# Patient Record
Sex: FEMALE | Race: OTHER | NOT HISPANIC OR LATINO | URBAN - METROPOLITAN AREA
[De-identification: names, ages, dates, MRNs, and addresses within clinical notes are randomized per-mention and may not be internally consistent; named-entity substitution may affect disease eponyms.]

---

## 2017-10-18 ENCOUNTER — OUTPATIENT (OUTPATIENT)
Dept: OUTPATIENT SERVICES | Facility: HOSPITAL | Age: 62
LOS: 1 days | End: 2017-10-18
Payer: COMMERCIAL

## 2017-10-18 DIAGNOSIS — J45.30 MILD PERSISTENT ASTHMA, UNCOMPLICATED: ICD-10-CM

## 2017-10-18 PROCEDURE — 94729 DIFFUSING CAPACITY: CPT

## 2017-10-18 PROCEDURE — 94060 EVALUATION OF WHEEZING: CPT

## 2017-10-18 PROCEDURE — 94010 BREATHING CAPACITY TEST: CPT

## 2018-12-27 ENCOUNTER — EMERGENCY (EMERGENCY)
Facility: HOSPITAL | Age: 63
LOS: 1 days | Discharge: HOME | End: 2018-12-27
Attending: EMERGENCY MEDICINE | Admitting: EMERGENCY MEDICINE
Payer: COMMERCIAL

## 2018-12-27 VITALS
HEART RATE: 81 BPM | TEMPERATURE: 99 F | DIASTOLIC BLOOD PRESSURE: 73 MMHG | SYSTOLIC BLOOD PRESSURE: 107 MMHG | RESPIRATION RATE: 16 BRPM | OXYGEN SATURATION: 100 %

## 2018-12-27 VITALS
DIASTOLIC BLOOD PRESSURE: 51 MMHG | SYSTOLIC BLOOD PRESSURE: 118 MMHG | RESPIRATION RATE: 14 BRPM | OXYGEN SATURATION: 100 % | TEMPERATURE: 99 F | HEART RATE: 58 BPM

## 2018-12-27 LAB
ALBUMIN SERPL ELPH-MCNC: 3.9 G/DL — SIGNIFICANT CHANGE UP (ref 3.3–5)
ALP SERPL-CCNC: 115 U/L — SIGNIFICANT CHANGE UP (ref 40–120)
ALT FLD-CCNC: 18 U/L — SIGNIFICANT CHANGE UP (ref 4–33)
AST SERPL-CCNC: 10 U/L — SIGNIFICANT CHANGE UP (ref 4–32)
BASE EXCESS BLDV CALC-SCNC: 5 MMOL/L — SIGNIFICANT CHANGE UP
BASOPHILS # BLD AUTO: 0.01 K/UL — SIGNIFICANT CHANGE UP (ref 0–0.2)
BASOPHILS NFR BLD AUTO: 0.1 % — SIGNIFICANT CHANGE UP (ref 0–2)
BILIRUB SERPL-MCNC: 0.4 MG/DL — SIGNIFICANT CHANGE UP (ref 0.2–1.2)
BLOOD GAS VENOUS - CREATININE: 0.46 MG/DL — LOW (ref 0.5–1.3)
BUN SERPL-MCNC: 27 MG/DL — HIGH (ref 7–23)
CALCIUM SERPL-MCNC: 8.8 MG/DL — SIGNIFICANT CHANGE UP (ref 8.4–10.5)
CHLORIDE BLDV-SCNC: 105 MMOL/L — SIGNIFICANT CHANGE UP (ref 96–108)
CHLORIDE SERPL-SCNC: 100 MMOL/L — SIGNIFICANT CHANGE UP (ref 98–107)
CO2 SERPL-SCNC: 28 MMOL/L — SIGNIFICANT CHANGE UP (ref 22–31)
CREAT SERPL-MCNC: 0.68 MG/DL — SIGNIFICANT CHANGE UP (ref 0.5–1.3)
EOSINOPHIL # BLD AUTO: 0.22 K/UL — SIGNIFICANT CHANGE UP (ref 0–0.5)
EOSINOPHIL NFR BLD AUTO: 2.6 % — SIGNIFICANT CHANGE UP (ref 0–6)
GAS PNL BLDV: 135 MMOL/L — LOW (ref 136–146)
GLUCOSE BLDV-MCNC: 148 — HIGH (ref 70–99)
GLUCOSE SERPL-MCNC: 135 MG/DL — HIGH (ref 70–99)
HCO3 BLDV-SCNC: 26 MMOL/L — SIGNIFICANT CHANGE UP (ref 20–27)
HCT VFR BLD CALC: 43.7 % — SIGNIFICANT CHANGE UP (ref 34.5–45)
HCT VFR BLDV CALC: 42 % — SIGNIFICANT CHANGE UP (ref 34.5–45)
HGB BLD-MCNC: 13.1 G/DL — SIGNIFICANT CHANGE UP (ref 11.5–15.5)
HGB BLDV-MCNC: 13.7 G/DL — SIGNIFICANT CHANGE UP (ref 11.5–15.5)
IMM GRANULOCYTES # BLD AUTO: 0.03 # — SIGNIFICANT CHANGE UP
IMM GRANULOCYTES NFR BLD AUTO: 0.3 % — SIGNIFICANT CHANGE UP (ref 0–1.5)
LACTATE BLDV-MCNC: 1.2 MMOL/L — SIGNIFICANT CHANGE UP (ref 0.5–2)
LIDOCAIN IGE QN: 45.2 U/L — SIGNIFICANT CHANGE UP (ref 7–60)
LYMPHOCYTES # BLD AUTO: 1.92 K/UL — SIGNIFICANT CHANGE UP (ref 1–3.3)
LYMPHOCYTES # BLD AUTO: 22.3 % — SIGNIFICANT CHANGE UP (ref 13–44)
MCHC RBC-ENTMCNC: 23.6 PG — LOW (ref 27–34)
MCHC RBC-ENTMCNC: 30 % — LOW (ref 32–36)
MCV RBC AUTO: 78.9 FL — LOW (ref 80–100)
MONOCYTES # BLD AUTO: 0.54 K/UL — SIGNIFICANT CHANGE UP (ref 0–0.9)
MONOCYTES NFR BLD AUTO: 6.3 % — SIGNIFICANT CHANGE UP (ref 2–14)
NEUTROPHILS # BLD AUTO: 5.88 K/UL — SIGNIFICANT CHANGE UP (ref 1.8–7.4)
NEUTROPHILS NFR BLD AUTO: 68.4 % — SIGNIFICANT CHANGE UP (ref 43–77)
NRBC # FLD: 0 — SIGNIFICANT CHANGE UP
PCO2 BLDV: 52 MMHG — HIGH (ref 41–51)
PH BLDV: 7.38 PH — SIGNIFICANT CHANGE UP (ref 7.32–7.43)
PLATELET # BLD AUTO: 233 K/UL — SIGNIFICANT CHANGE UP (ref 150–400)
PMV BLD: 11.9 FL — SIGNIFICANT CHANGE UP (ref 7–13)
PO2 BLDV: 20 MMHG — LOW (ref 35–40)
POTASSIUM BLDV-SCNC: 3.7 MMOL/L — SIGNIFICANT CHANGE UP (ref 3.4–4.5)
POTASSIUM SERPL-MCNC: 3.9 MMOL/L — SIGNIFICANT CHANGE UP (ref 3.5–5.3)
POTASSIUM SERPL-SCNC: 3.9 MMOL/L — SIGNIFICANT CHANGE UP (ref 3.5–5.3)
PROT SERPL-MCNC: 6.9 G/DL — SIGNIFICANT CHANGE UP (ref 6–8.3)
RBC # BLD: 5.54 M/UL — HIGH (ref 3.8–5.2)
RBC # FLD: 16.7 % — HIGH (ref 10.3–14.5)
SAO2 % BLDV: 24.3 % — LOW (ref 60–85)
SODIUM SERPL-SCNC: 138 MMOL/L — SIGNIFICANT CHANGE UP (ref 135–145)
TROPONIN T, HIGH SENSITIVITY: < 6 NG/L — SIGNIFICANT CHANGE UP (ref ?–14)
WBC # BLD: 8.6 K/UL — SIGNIFICANT CHANGE UP (ref 3.8–10.5)
WBC # FLD AUTO: 8.6 K/UL — SIGNIFICANT CHANGE UP (ref 3.8–10.5)

## 2018-12-27 PROCEDURE — 71046 X-RAY EXAM CHEST 2 VIEWS: CPT | Mod: 26

## 2018-12-27 PROCEDURE — 76705 ECHO EXAM OF ABDOMEN: CPT | Mod: 26

## 2018-12-27 PROCEDURE — 99284 EMERGENCY DEPT VISIT MOD MDM: CPT | Mod: 25

## 2018-12-27 RX ORDER — FAMOTIDINE 10 MG/ML
20 INJECTION INTRAVENOUS ONCE
Qty: 0 | Refills: 0 | Status: COMPLETED | OUTPATIENT
Start: 2018-12-27 | End: 2018-12-27

## 2018-12-27 RX ORDER — SODIUM CHLORIDE 9 MG/ML
1000 INJECTION INTRAMUSCULAR; INTRAVENOUS; SUBCUTANEOUS ONCE
Qty: 0 | Refills: 0 | Status: COMPLETED | OUTPATIENT
Start: 2018-12-27 | End: 2018-12-27

## 2018-12-27 RX ORDER — ACETAMINOPHEN 500 MG
650 TABLET ORAL ONCE
Qty: 0 | Refills: 0 | Status: COMPLETED | OUTPATIENT
Start: 2018-12-27 | End: 2018-12-27

## 2018-12-27 RX ADMIN — Medication 650 MILLIGRAM(S): at 11:41

## 2018-12-27 RX ADMIN — SODIUM CHLORIDE 1000 MILLILITER(S): 9 INJECTION INTRAMUSCULAR; INTRAVENOUS; SUBCUTANEOUS at 12:16

## 2018-12-27 RX ADMIN — FAMOTIDINE 20 MILLIGRAM(S): 10 INJECTION INTRAVENOUS at 11:41

## 2018-12-27 NOTE — ED PROVIDER NOTE - NS ED ROS FT
GENERAL: +fever or chills, //             EYES: no change in vision, //             HEENT: no trouble swallowing or speaking, //             CARDIAC:  +chest wall pain, //              PULMONARY: + cough, no SOB, //             GI: +epigastric pain, no abdominal pain, no nausea or no vomiting, no diarrhea or constipation, //             : No changes in urination,  //            SKIN: no rashes,  //            NEURO: no headache,  //             MSK: +body aches, chest wall and back pain, otherwise as HPI or negative.

## 2018-12-27 NOTE — ED ADULT TRIAGE NOTE - CHIEF COMPLAINT QUOTE
pt states has been having chronic cough with clear sputum x 3 weeks with intermittent pains to mid sternal chest, bilateral ribs and mid back pain. Last night started with nausea/vomiting. C/o intermittent subjective fevers at night. pt states has been having chronic cough with clear sputum x 3 weeks with intermittent pains to mid sternal chest, bilateral ribs and mid back pain. Last night started with nausea/vomiting. C/o intermittent subjective fevers at night. Hx Dm2 [id number]

## 2018-12-27 NOTE — ED PROVIDER NOTE - PHYSICAL EXAMINATION
Gen: NAD, AOx3, able to make needs known, non-toxic //            Head: NCAT //            HEENT: EOMI, oral mucosa moist, normal conjunctiva //            Lung: CTAB, no respiratory distress, no wheezes/rhonchi/rales B/L, speaking in full sentences. //            CV: RRR, no murmurs//            Abd: soft, NT, +epigastric and RUQ pain, ND, no guarding, no CVA tenderness //            MSK: upper thoracic paraspinal tenderness b/l, no visible deformities //            Neuro: No focal sensory or motor deficits //            Skin: Warm, well perfused //            Psych: normal affect.

## 2018-12-27 NOTE — ED PROCEDURE NOTE - PROCEDURE ADDITIONAL DETAILS
Focused ED Ultrasound RUQ 92100  Grey scale RUQ views obtained in saggital and transverse planes.   No wall thickening, no gallbladder wall edema, and no pericholecystic fluid seen.  Negative sonographic hensley's.  No gallstones.    anterior gallbladder wall - 2.0 mm  CBD - 3.9 mm    Impression:  normal gallbladder.    - Nelli BENTLEY

## 2018-12-27 NOTE — ED PROVIDER NOTE - NSFOLLOWUPINSTRUCTIONS_ED_ALL_ED_FT
1) Follow up with your doctor within the next 2-3 days  2) Return to the ER immediately for new or worsening symptoms   3) You were provided with a copy of your test results

## 2018-12-27 NOTE — ED ADULT NURSE NOTE - CHIEF COMPLAINT QUOTE
pt states has been having chronic cough with clear sputum x 3 weeks with intermittent pains to mid sternal chest, bilateral ribs and mid back pain. Last night started with nausea/vomiting. C/o intermittent subjective fevers at night. Hx Dm2 [id number]

## 2018-12-27 NOTE — ED ADULT NURSE NOTE - NSIMPLEMENTINTERV_GEN_ALL_ED
Implemented All Universal Safety Interventions:  [place] to call system. Call bell, personal items and telephone within reach. Instruct patient to call for assistance. Room bathroom lighting operational. Non-slip footwear when patient is off stretcher. Physically safe environment: no spills, clutter or unnecessary equipment. Stretcher in lowest position, wheels locked, appropriate side rails in place.

## 2018-12-27 NOTE — ED PROVIDER NOTE - ATTENDING CONTRIBUTION TO CARE
Patient is a 64 yo F with DM, hypothyroidism, CAD with stents here for evaluation cough for 3 weeks. She states she has had intermittent tight chest pain, worse with movement and cough. Reports shortness of breath with exertion such as climbing stairs. Denies nausea, vomiting. + body aches, lower and upper back. Reports subjective fevers. She had multiple episodes of nbnb vomiting last night. No diarrhea. She is c/o epigastric abdominal pain.     VS noted  Gen. no acute distress, Non toxic   HEENT: EOMI, mmm  Lungs: CTAB/L no C/ W /R   CVS: RRR   Abd; Soft, ruq tenderness, epigastric tenderness, no rebound or guarding  Ext: no edema  Skin: no rash  Neuro AAOx3 non focal clear speech  a/p: RUQ tenderness, multiple complaints - ekg, labs including lipase, trop. Tyelnol/pepcid/IVF.   - Nelli BENTLEY Patient is a 64 yo F with asthma, DM, hypothyroidism, CAD with stents here for evaluation cough for 3 weeks. She states she has had intermittent tight chest pain, worse with movement and cough. Reports shortness of breath with exertion such as climbing stairs. + body aches, lower and upper back. Reports subjective fevers. She had multiple episodes of nbnb vomiting last night. No diarrhea. She is c/o epigastric abdominal pain. Patient saw her pcp, and was placed on prednisone 10 mg a day, given a 30 day supply. Patient states she traveled from [facility] 3 weeks ago and has seen her pcp 3 times.   pmd: Dr. Kavon Vergara  meds: benzonatate 100 mg TID, montelukast    VS noted  Gen. no acute distress, Non toxic   HEENT: EOMI, mmm  Lungs: CTAB/L no C/ W /R   CVS: RRR   Abd; Soft, ruq tenderness, epigastric tenderness, no rebound or guarding  Ext: no edema  Skin: no rash  Neuro AAOx3 non focal clear speech  a/p: RUQ tenderness, multiple complaints - ekg, labs including lipase, trop. Tyelnol/pepcid/IVF.   - Nelli BENTLEY

## 2018-12-27 NOTE — ED PROVIDER NOTE - MEDICAL DECISION MAKING DETAILS
62 y/o F w/ cough, body aches, epigastric pain, and N/V. Likely viral illnesses, but given age and PMH will eval for alternative cause of symptoms. CBC, CMP, VBG, lipase, trop, pepcid, tylenol, ekg, cxr, RUQ sono, reassess.

## 2018-12-27 NOTE — ED PROVIDER NOTE - PROGRESS NOTE DETAILS
Lisa: Spoke w/ PMD Dr. Navarro. Informed him of unremarkable test results. Clarified with him that pt is to be taking prescribed prednisone for 5-7 days at a time and not 30 days consecutively. Clarified this with the pt so she is aware. Dr. Jacobson stated he would refer pt to pulmonologist for persistent cough and SOB. Will PO challenge pt and DC. Patient tolerating PO. Will d/c home.

## 2018-12-27 NOTE — ED PROVIDER NOTE - OBJECTIVE STATEMENT
62 y/o F w/ PMH of CAD, DM, Hypothyroid presenting w/ multiple complaints. Pt reports developing a lingering cough for the past 3 weeks. Cough is non productive of sputum. Shortly after the cough started she developed chest and upper back pain as well. This pain is intermittent and worse w/ movement. Develops some shortness of breath w/ climbing stairs but no SOB at this time. Subjective fevers at home occasionally during this time, but she did not check her temperature. Also developed nausea and vomiting last night. 4 episodes all non bloody and non bilious. Has developed some epigastric pain since the nausea/vomiting. Currently no nausea/vomiting. +flu and pneumonia vaccine.

## 2018-12-27 NOTE — ED ADULT NURSE NOTE - OBJECTIVE STATEMENT
Pt A&Ox3. Ambulatory to the ED with c/o midsternal chest pain, cough with clear sputum. Pt reports n/v x4 since last night. Respirations equal, nonlabored, no sign of respiratory distress. Abdomen soft, nontender, no distention noted. Denies sob, v/d, fevers, chills at this time. IV placed, labs sent. Awaiting results will monitor

## 2019-09-22 ENCOUNTER — EMERGENCY (EMERGENCY)
Facility: HOSPITAL | Age: 64
LOS: 1 days | Discharge: HOME | End: 2019-09-22
Admitting: EMERGENCY MEDICINE
Payer: COMMERCIAL

## 2019-09-22 VITALS
OXYGEN SATURATION: 99 % | SYSTOLIC BLOOD PRESSURE: 175 MMHG | HEART RATE: 64 BPM | RESPIRATION RATE: 18 BRPM | DIASTOLIC BLOOD PRESSURE: 62 MMHG | TEMPERATURE: 98 F

## 2019-09-22 PROCEDURE — 99284 EMERGENCY DEPT VISIT MOD MDM: CPT

## 2019-09-22 PROCEDURE — 93971 EXTREMITY STUDY: CPT | Mod: 26,LT

## 2019-09-22 RX ORDER — ACETAMINOPHEN 500 MG
650 TABLET ORAL ONCE
Refills: 0 | Status: COMPLETED | OUTPATIENT
Start: 2019-09-22 | End: 2019-09-22

## 2019-09-22 RX ADMIN — Medication 650 MILLIGRAM(S): at 20:38

## 2019-09-22 NOTE — ED ADULT TRIAGE NOTE - CHIEF COMPLAINT QUOTE
Pt c/o pain behind L knee x 4 days. States she had a "microfoam sclerotherapy injection" to same area apprx 10 days ago. Hx: Hypothyroid,. Pt describes pain as "tightness." Ambulating in triage.

## 2019-09-22 NOTE — ED PROVIDER NOTE - NSFOLLOWUPINSTRUCTIONS_ED_ALL_ED_FT
Follow up with your primary care doctor and vascular doctor within one week, discuss further injections with them  Follow up with an orthopedic doctor within 1 week if pain persists, referral list provided  Take Tylenol 650mg every 6 hours for pain (you can also take Motrin 400mg every 6 hours, take with food)  Apply ACE wrap for compression and comfort  Rest and elevate the leg  Return to the ER with any worsening or concerning symptoms, increased pain or swelling, redness, fever/chills, numbness, weakness or any other concerns.

## 2019-09-22 NOTE — ED PROVIDER NOTE - PHYSICAL EXAMINATION
pt with b/l varicose veins  left knee with mild swelling to medial/posterior aspect, near full ROM, non-tender to palpation, no overlying warmth or erythema pt with b/l varicose veins  left knee with mild swelling to posterior aspect likely Bakers cyst, near full ROM, non-tender to palpation, no overlying warmth or erythema  pt is able to ambulate   no calf swelling or tenderness, sensation intact and equal b/l, strength 5/5 in b/l LEs

## 2019-09-22 NOTE — ED PROVIDER NOTE - CLINICAL SUMMARY MEDICAL DECISION MAKING FREE TEXT BOX
64yF w/pmhx DM, CAD, hypothyroid presenting with left knee pain. Recent microfoam sclerotherapy injection 2 weeks ago. Sensation intact and equal b/l, near full ROM for left knee. Will get duplex to r/o DVT, pt saw her PMD yesterday and was started on gabapentin for pain

## 2019-09-22 NOTE — ED PROVIDER NOTE - OBJECTIVE STATEMENT
64yF w/pmhx DM, CAD, hypothyroid presenting with left knee pain. Pt states she has been receiving treatment for varicose veins, 1 month ago she had a laser procedure to the right leg and 2 weeks ago she had an injection of foam sclerotherapy to behind the left knee. Pt states since the injection she has pain and swelling to the left knee and she is unable to fully range the knee. Pt reports difficulty ambulating as she is unable to fully bend the knee. Pt reports she has has been in the upper leg as well from the hip down to the knee. Pt denies numbness/tingling, weakness, hx DVT, injury to knee, fever/chills, erythema to leg, recent travel or illness or any other concerns.  Of note, pt reports normal lower extremity angiogram performed 3 months ago.

## 2019-09-22 NOTE — ED PROVIDER NOTE - PATIENT PORTAL LINK FT
You can access the FollowMyHealth Patient Portal offered by [facility] by registering at the following website: [email or web address]. By joining [employer]’s FollowMyHealth portal, you will also be able to view your health information using other applications (apps) compatible with our system.

## 2019-09-22 NOTE — ED PROVIDER NOTE - PROGRESS NOTE DETAILS
TAHIR Donis: US showing Bakers Cyst, no evidence of DVT although calf veins not well visualized will have pt repeat US with her PMD in 1 week. Pt provided ace bandage and advised to take Motrin 400mg every 8 hours pain. Pt provided with ortho follow up list and advised her to see her vascular doctor before having any further injections in the area TAHIR Donis: US showing Bakers Cyst, no evidence of DVT although calf veins not well visualized will have pt repeat US with her PMD in 1 week. Pt provided ace bandage and advised to take Motrin 400mg every 8 hours pain. Pt provided with ortho follow up list and advised her to see her vascular doctor before having any further injections in the area. Also spoke with pts daughter Shanda over the phone, advised ortho and PMD follow up and to discuss further injections with her vascular doctor. Discussed she should have a repeat US performed as calf veins were not well visualized

## 2020-08-04 NOTE — ED PROVIDER NOTE - CROS ED CONS ALL NEG
Quality 110: Preventive Care And Screening: Influenza Immunization: Influenza Immunization not Administered because Patient Refused.
Detail Level: Detailed
negative...

## 2022-05-29 ENCOUNTER — RESULT REVIEW (OUTPATIENT)
Age: 67
End: 2022-05-29

## 2022-05-29 ENCOUNTER — INPATIENT (INPATIENT)
Facility: HOSPITAL | Age: 67
LOS: 0 days | Discharge: HOME | End: 2022-05-30
Attending: SURGERY | Admitting: SURGERY
Payer: MEDICARE

## 2022-05-29 VITALS
RESPIRATION RATE: 18 BRPM | HEART RATE: 78 BPM | SYSTOLIC BLOOD PRESSURE: 144 MMHG | OXYGEN SATURATION: 99 % | DIASTOLIC BLOOD PRESSURE: 85 MMHG | TEMPERATURE: 98 F

## 2022-05-29 DIAGNOSIS — R10.9 UNSPECIFIED ABDOMINAL PAIN: ICD-10-CM

## 2022-05-29 PROBLEM — E03.9 HYPOTHYROIDISM, UNSPECIFIED: Chronic | Status: ACTIVE | Noted: 2019-09-22

## 2022-05-29 PROBLEM — E11.9 TYPE 2 DIABETES MELLITUS WITHOUT COMPLICATIONS: Chronic | Status: ACTIVE | Noted: 2019-09-22

## 2022-05-29 LAB
ALBUMIN SERPL ELPH-MCNC: 4 G/DL — SIGNIFICANT CHANGE UP (ref 3.3–5)
ALP SERPL-CCNC: 110 U/L — SIGNIFICANT CHANGE UP (ref 40–120)
ALT FLD-CCNC: 28 U/L — SIGNIFICANT CHANGE UP (ref 4–33)
ANION GAP SERPL CALC-SCNC: 13 MMOL/L — SIGNIFICANT CHANGE UP (ref 7–14)
APPEARANCE UR: CLEAR — SIGNIFICANT CHANGE UP
APTT BLD: 29.7 SEC — SIGNIFICANT CHANGE UP (ref 27–36.3)
AST SERPL-CCNC: 32 U/L — SIGNIFICANT CHANGE UP (ref 4–32)
BACTERIA # UR AUTO: ABNORMAL
BASE EXCESS BLDV CALC-SCNC: 0.8 MMOL/L — SIGNIFICANT CHANGE UP (ref -2–3)
BASE EXCESS BLDV CALC-SCNC: 1.8 MMOL/L — SIGNIFICANT CHANGE UP (ref -2–3)
BASOPHILS # BLD AUTO: 0.03 K/UL — SIGNIFICANT CHANGE UP (ref 0–0.2)
BASOPHILS NFR BLD AUTO: 0.2 % — SIGNIFICANT CHANGE UP (ref 0–2)
BILIRUB SERPL-MCNC: 0.5 MG/DL — SIGNIFICANT CHANGE UP (ref 0.2–1.2)
BILIRUB UR-MCNC: NEGATIVE — SIGNIFICANT CHANGE UP
BLD GP AB SCN SERPL QL: NEGATIVE — SIGNIFICANT CHANGE UP
BLOOD GAS VENOUS COMPREHENSIVE RESULT: SIGNIFICANT CHANGE UP
BLOOD GAS VENOUS COMPREHENSIVE RESULT: SIGNIFICANT CHANGE UP
BUN SERPL-MCNC: 12 MG/DL — SIGNIFICANT CHANGE UP (ref 7–23)
CALCIUM SERPL-MCNC: 9.5 MG/DL — SIGNIFICANT CHANGE UP (ref 8.4–10.5)
CHLORIDE BLDV-SCNC: 102 MMOL/L — SIGNIFICANT CHANGE UP (ref 96–108)
CHLORIDE BLDV-SCNC: 102 MMOL/L — SIGNIFICANT CHANGE UP (ref 96–108)
CHLORIDE SERPL-SCNC: 100 MMOL/L — SIGNIFICANT CHANGE UP (ref 98–107)
CO2 BLDV-SCNC: 29 MMOL/L — HIGH (ref 22–26)
CO2 BLDV-SCNC: 29.8 MMOL/L — HIGH (ref 22–26)
CO2 SERPL-SCNC: 24 MMOL/L — SIGNIFICANT CHANGE UP (ref 22–31)
COLOR SPEC: SIGNIFICANT CHANGE UP
CREAT SERPL-MCNC: 0.65 MG/DL — SIGNIFICANT CHANGE UP (ref 0.5–1.3)
DIFF PNL FLD: NEGATIVE — SIGNIFICANT CHANGE UP
EGFR: 96 ML/MIN/1.73M2 — SIGNIFICANT CHANGE UP
EOSINOPHIL # BLD AUTO: 0.13 K/UL — SIGNIFICANT CHANGE UP (ref 0–0.5)
EOSINOPHIL NFR BLD AUTO: 0.7 % — SIGNIFICANT CHANGE UP (ref 0–6)
EPI CELLS # UR: 3 /HPF — SIGNIFICANT CHANGE UP (ref 0–5)
GAS PNL BLDV: 135 MMOL/L — LOW (ref 136–145)
GAS PNL BLDV: 135 MMOL/L — LOW (ref 136–145)
GLUCOSE BLDC GLUCOMTR-MCNC: 120 MG/DL — HIGH (ref 70–99)
GLUCOSE BLDC GLUCOMTR-MCNC: 133 MG/DL — HIGH (ref 70–99)
GLUCOSE BLDC GLUCOMTR-MCNC: 160 MG/DL — HIGH (ref 70–99)
GLUCOSE BLDC GLUCOMTR-MCNC: 97 MG/DL — SIGNIFICANT CHANGE UP (ref 70–99)
GLUCOSE BLDV-MCNC: 124 MG/DL — HIGH (ref 70–99)
GLUCOSE BLDV-MCNC: 137 MG/DL — HIGH (ref 70–99)
GLUCOSE SERPL-MCNC: 134 MG/DL — HIGH (ref 70–99)
GLUCOSE UR QL: NEGATIVE — SIGNIFICANT CHANGE UP
HCO3 BLDV-SCNC: 27 MMOL/L — SIGNIFICANT CHANGE UP (ref 22–29)
HCO3 BLDV-SCNC: 28 MMOL/L — SIGNIFICANT CHANGE UP (ref 22–29)
HCT VFR BLD CALC: 38.4 % — SIGNIFICANT CHANGE UP (ref 34.5–45)
HCT VFR BLDA CALC: 35 % — SIGNIFICANT CHANGE UP (ref 34.5–46.5)
HCT VFR BLDA CALC: 36 % — SIGNIFICANT CHANGE UP (ref 34.5–46.5)
HGB BLD CALC-MCNC: 11.6 G/DL — SIGNIFICANT CHANGE UP (ref 11.5–15.5)
HGB BLD CALC-MCNC: 12.1 G/DL — SIGNIFICANT CHANGE UP (ref 11.5–15.5)
HGB BLD-MCNC: 11.9 G/DL — SIGNIFICANT CHANGE UP (ref 11.5–15.5)
HYALINE CASTS # UR AUTO: 1 /LPF — SIGNIFICANT CHANGE UP (ref 0–7)
IANC: 15.36 K/UL — HIGH (ref 1.8–7.4)
IMM GRANULOCYTES NFR BLD AUTO: 0.4 % — SIGNIFICANT CHANGE UP (ref 0–1.5)
INR BLD: 1.09 RATIO — SIGNIFICANT CHANGE UP (ref 0.88–1.16)
KETONES UR-MCNC: NEGATIVE — SIGNIFICANT CHANGE UP
LACTATE BLDV-MCNC: 1.8 MMOL/L — SIGNIFICANT CHANGE UP (ref 0.5–2)
LACTATE BLDV-MCNC: 2.5 MMOL/L — HIGH (ref 0.5–2)
LEUKOCYTE ESTERASE UR-ACNC: ABNORMAL
LIDOCAIN IGE QN: 54 U/L — SIGNIFICANT CHANGE UP (ref 7–60)
LYMPHOCYTES # BLD AUTO: 12.8 % — LOW (ref 13–44)
LYMPHOCYTES # BLD AUTO: 2.44 K/UL — SIGNIFICANT CHANGE UP (ref 1–3.3)
MAGNESIUM SERPL-MCNC: 1.8 MG/DL — SIGNIFICANT CHANGE UP (ref 1.6–2.6)
MCHC RBC-ENTMCNC: 24.3 PG — LOW (ref 27–34)
MCHC RBC-ENTMCNC: 31 GM/DL — LOW (ref 32–36)
MCV RBC AUTO: 78.5 FL — LOW (ref 80–100)
MONOCYTES # BLD AUTO: 0.97 K/UL — HIGH (ref 0–0.9)
MONOCYTES NFR BLD AUTO: 5.1 % — SIGNIFICANT CHANGE UP (ref 2–14)
NEUTROPHILS # BLD AUTO: 15.36 K/UL — HIGH (ref 1.8–7.4)
NEUTROPHILS NFR BLD AUTO: 80.8 % — HIGH (ref 43–77)
NITRITE UR-MCNC: NEGATIVE — SIGNIFICANT CHANGE UP
NRBC # BLD: 0 /100 WBCS — SIGNIFICANT CHANGE UP
NRBC # FLD: 0 K/UL — SIGNIFICANT CHANGE UP
PCO2 BLDV: 51 MMHG — HIGH (ref 39–42)
PCO2 BLDV: 52 MMHG — HIGH (ref 39–42)
PH BLDV: 7.33 — SIGNIFICANT CHANGE UP (ref 7.32–7.43)
PH BLDV: 7.35 — SIGNIFICANT CHANGE UP (ref 7.32–7.43)
PH UR: 7 — SIGNIFICANT CHANGE UP (ref 5–8)
PLATELET # BLD AUTO: 253 K/UL — SIGNIFICANT CHANGE UP (ref 150–400)
PO2 BLDV: 21 MMHG — SIGNIFICANT CHANGE UP
PO2 BLDV: 31 MMHG — SIGNIFICANT CHANGE UP
POTASSIUM BLDV-SCNC: 4.2 MMOL/L — SIGNIFICANT CHANGE UP (ref 3.5–5.1)
POTASSIUM BLDV-SCNC: 4.2 MMOL/L — SIGNIFICANT CHANGE UP (ref 3.5–5.1)
POTASSIUM SERPL-MCNC: 5.3 MMOL/L — SIGNIFICANT CHANGE UP (ref 3.5–5.3)
POTASSIUM SERPL-SCNC: 5.3 MMOL/L — SIGNIFICANT CHANGE UP (ref 3.5–5.3)
PROT SERPL-MCNC: 7.4 G/DL — SIGNIFICANT CHANGE UP (ref 6–8.3)
PROT UR-MCNC: NEGATIVE — SIGNIFICANT CHANGE UP
PROTHROM AB SERPL-ACNC: 12.7 SEC — SIGNIFICANT CHANGE UP (ref 10.5–13.4)
RBC # BLD: 4.89 M/UL — SIGNIFICANT CHANGE UP (ref 3.8–5.2)
RBC # FLD: 17.3 % — HIGH (ref 10.3–14.5)
RBC CASTS # UR COMP ASSIST: 0 /HPF — SIGNIFICANT CHANGE UP (ref 0–4)
RH IG SCN BLD-IMP: POSITIVE — SIGNIFICANT CHANGE UP
RH IG SCN BLD-IMP: POSITIVE — SIGNIFICANT CHANGE UP
SAO2 % BLDV: 18.7 % — SIGNIFICANT CHANGE UP
SAO2 % BLDV: 43.9 % — SIGNIFICANT CHANGE UP
SARS-COV-2 RNA SPEC QL NAA+PROBE: SIGNIFICANT CHANGE UP
SODIUM SERPL-SCNC: 137 MMOL/L — SIGNIFICANT CHANGE UP (ref 135–145)
SP GR SPEC: 1.01 — SIGNIFICANT CHANGE UP (ref 1–1.05)
TROPONIN T, HIGH SENSITIVITY RESULT: 6 NG/L — SIGNIFICANT CHANGE UP
TSH SERPL-MCNC: 1.39 UIU/ML — SIGNIFICANT CHANGE UP (ref 0.27–4.2)
UROBILINOGEN FLD QL: SIGNIFICANT CHANGE UP
WBC # BLD: 19 K/UL — HIGH (ref 3.8–10.5)
WBC # FLD AUTO: 19 K/UL — HIGH (ref 3.8–10.5)
WBC UR QL: 8 /HPF — HIGH (ref 0–5)

## 2022-05-29 PROCEDURE — 74177 CT ABD & PELVIS W/CONTRAST: CPT | Mod: 26,MA

## 2022-05-29 PROCEDURE — 99223 1ST HOSP IP/OBS HIGH 75: CPT | Mod: 25

## 2022-05-29 PROCEDURE — 71045 X-RAY EXAM CHEST 1 VIEW: CPT | Mod: 26

## 2022-05-29 PROCEDURE — 99285 EMERGENCY DEPT VISIT HI MDM: CPT

## 2022-05-29 PROCEDURE — 88300 SURGICAL PATH GROSS: CPT | Mod: 26

## 2022-05-29 PROCEDURE — 43235 EGD DIAGNOSTIC BRUSH WASH: CPT

## 2022-05-29 PROCEDURE — 76705 ECHO EXAM OF ABDOMEN: CPT | Mod: 26

## 2022-05-29 RX ORDER — INSULIN GLARGINE 100 [IU]/ML
15 INJECTION, SOLUTION SUBCUTANEOUS AT BEDTIME
Refills: 0 | Status: DISCONTINUED | OUTPATIENT
Start: 2022-05-29 | End: 2022-05-30

## 2022-05-29 RX ORDER — DEXTROSE 50 % IN WATER 50 %
15 SYRINGE (ML) INTRAVENOUS ONCE
Refills: 0 | Status: DISCONTINUED | OUTPATIENT
Start: 2022-05-29 | End: 2022-05-30

## 2022-05-29 RX ORDER — BUDESONIDE AND FORMOTEROL FUMARATE DIHYDRATE 160; 4.5 UG/1; UG/1
2 AEROSOL RESPIRATORY (INHALATION)
Refills: 0 | Status: DISCONTINUED | OUTPATIENT
Start: 2022-05-29 | End: 2022-05-30

## 2022-05-29 RX ORDER — SODIUM CHLORIDE 9 MG/ML
1000 INJECTION, SOLUTION INTRAVENOUS
Refills: 0 | Status: DISCONTINUED | OUTPATIENT
Start: 2022-05-29 | End: 2022-05-30

## 2022-05-29 RX ORDER — ATORVASTATIN CALCIUM 80 MG/1
1 TABLET, FILM COATED ORAL
Qty: 0 | Refills: 0 | DISCHARGE

## 2022-05-29 RX ORDER — ACETAMINOPHEN 500 MG
1000 TABLET ORAL ONCE
Refills: 0 | Status: COMPLETED | OUTPATIENT
Start: 2022-05-29 | End: 2022-05-29

## 2022-05-29 RX ORDER — PANTOPRAZOLE SODIUM 20 MG/1
40 TABLET, DELAYED RELEASE ORAL DAILY
Refills: 0 | Status: DISCONTINUED | OUTPATIENT
Start: 2022-05-29 | End: 2022-05-29

## 2022-05-29 RX ORDER — SODIUM CHLORIDE 9 MG/ML
1000 INJECTION INTRAMUSCULAR; INTRAVENOUS; SUBCUTANEOUS
Refills: 0 | Status: DISCONTINUED | OUTPATIENT
Start: 2022-05-29 | End: 2022-05-30

## 2022-05-29 RX ORDER — AMLODIPINE BESYLATE 2.5 MG/1
1 TABLET ORAL
Qty: 0 | Refills: 0 | DISCHARGE

## 2022-05-29 RX ORDER — DEXTROSE 50 % IN WATER 50 %
12.5 SYRINGE (ML) INTRAVENOUS ONCE
Refills: 0 | Status: DISCONTINUED | OUTPATIENT
Start: 2022-05-29 | End: 2022-05-30

## 2022-05-29 RX ORDER — PIPERACILLIN AND TAZOBACTAM 4; .5 G/20ML; G/20ML
3.38 INJECTION, POWDER, LYOPHILIZED, FOR SOLUTION INTRAVENOUS ONCE
Refills: 0 | Status: COMPLETED | OUTPATIENT
Start: 2022-05-29 | End: 2022-05-29

## 2022-05-29 RX ORDER — SODIUM CHLORIDE 9 MG/ML
500 INJECTION INTRAMUSCULAR; INTRAVENOUS; SUBCUTANEOUS ONCE
Refills: 0 | Status: COMPLETED | OUTPATIENT
Start: 2022-05-29 | End: 2022-05-29

## 2022-05-29 RX ORDER — LEVOTHYROXINE SODIUM 125 MCG
84 TABLET ORAL AT BEDTIME
Refills: 0 | Status: DISCONTINUED | OUTPATIENT
Start: 2022-05-29 | End: 2022-05-30

## 2022-05-29 RX ORDER — INSULIN LISPRO 100/ML
VIAL (ML) SUBCUTANEOUS AT BEDTIME
Refills: 0 | Status: DISCONTINUED | OUTPATIENT
Start: 2022-05-29 | End: 2022-05-30

## 2022-05-29 RX ORDER — INSULIN LISPRO 100/ML
VIAL (ML) SUBCUTANEOUS
Refills: 0 | Status: DISCONTINUED | OUTPATIENT
Start: 2022-05-29 | End: 2022-05-29

## 2022-05-29 RX ORDER — GLUCAGON INJECTION, SOLUTION 0.5 MG/.1ML
1 INJECTION, SOLUTION SUBCUTANEOUS ONCE
Refills: 0 | Status: DISCONTINUED | OUTPATIENT
Start: 2022-05-29 | End: 2022-05-30

## 2022-05-29 RX ORDER — SODIUM CHLORIDE 9 MG/ML
1000 INJECTION INTRAMUSCULAR; INTRAVENOUS; SUBCUTANEOUS ONCE
Refills: 0 | Status: COMPLETED | OUTPATIENT
Start: 2022-05-29 | End: 2022-05-29

## 2022-05-29 RX ORDER — INSULIN LISPRO 100/ML
VIAL (ML) SUBCUTANEOUS
Refills: 0 | Status: DISCONTINUED | OUTPATIENT
Start: 2022-05-29 | End: 2022-05-30

## 2022-05-29 RX ORDER — LANOLIN ALCOHOL/MO/W.PET/CERES
3 CREAM (GRAM) TOPICAL ONCE
Refills: 0 | Status: COMPLETED | OUTPATIENT
Start: 2022-05-29 | End: 2022-05-29

## 2022-05-29 RX ORDER — MORPHINE SULFATE 50 MG/1
4 CAPSULE, EXTENDED RELEASE ORAL ONCE
Refills: 0 | Status: DISCONTINUED | OUTPATIENT
Start: 2022-05-29 | End: 2022-05-29

## 2022-05-29 RX ORDER — FLUCONAZOLE 150 MG/1
200 TABLET ORAL EVERY 24 HOURS
Refills: 0 | Status: DISCONTINUED | OUTPATIENT
Start: 2022-05-29 | End: 2022-05-29

## 2022-05-29 RX ORDER — LEVOTHYROXINE SODIUM 125 MCG
1 TABLET ORAL
Qty: 0 | Refills: 0 | DISCHARGE

## 2022-05-29 RX ORDER — PIPERACILLIN AND TAZOBACTAM 4; .5 G/20ML; G/20ML
3.38 INJECTION, POWDER, LYOPHILIZED, FOR SOLUTION INTRAVENOUS EVERY 8 HOURS
Refills: 0 | Status: DISCONTINUED | OUTPATIENT
Start: 2022-05-29 | End: 2022-05-30

## 2022-05-29 RX ORDER — GABAPENTIN 400 MG/1
1 CAPSULE ORAL
Qty: 0 | Refills: 0 | DISCHARGE

## 2022-05-29 RX ORDER — MONTELUKAST 4 MG/1
1 TABLET, CHEWABLE ORAL
Qty: 0 | Refills: 0 | DISCHARGE

## 2022-05-29 RX ORDER — ENOXAPARIN SODIUM 100 MG/ML
40 INJECTION SUBCUTANEOUS EVERY 24 HOURS
Refills: 0 | Status: DISCONTINUED | OUTPATIENT
Start: 2022-05-29 | End: 2022-05-30

## 2022-05-29 RX ORDER — DEXTROSE 50 % IN WATER 50 %
25 SYRINGE (ML) INTRAVENOUS ONCE
Refills: 0 | Status: DISCONTINUED | OUTPATIENT
Start: 2022-05-29 | End: 2022-05-30

## 2022-05-29 RX ORDER — BUDESONIDE AND FORMOTEROL FUMARATE DIHYDRATE 160; 4.5 UG/1; UG/1
2 AEROSOL RESPIRATORY (INHALATION)
Qty: 0 | Refills: 0 | DISCHARGE

## 2022-05-29 RX ORDER — PANTOPRAZOLE SODIUM 20 MG/1
40 TABLET, DELAYED RELEASE ORAL
Refills: 0 | Status: DISCONTINUED | OUTPATIENT
Start: 2022-05-29 | End: 2022-05-30

## 2022-05-29 RX ORDER — ACETAMINOPHEN 500 MG
650 TABLET ORAL ONCE
Refills: 0 | Status: COMPLETED | OUTPATIENT
Start: 2022-05-29 | End: 2022-05-29

## 2022-05-29 RX ORDER — PIPERACILLIN AND TAZOBACTAM 4; .5 G/20ML; G/20ML
3.38 INJECTION, POWDER, LYOPHILIZED, FOR SOLUTION INTRAVENOUS ONCE
Refills: 0 | Status: DISCONTINUED | OUTPATIENT
Start: 2022-05-29 | End: 2022-05-30

## 2022-05-29 RX ORDER — SITAGLIPTIN AND METFORMIN HYDROCHLORIDE 500; 50 MG/1; MG/1
1 TABLET, FILM COATED ORAL
Qty: 0 | Refills: 0 | DISCHARGE

## 2022-05-29 RX ORDER — INSULIN GLARGINE 100 [IU]/ML
30 INJECTION, SOLUTION SUBCUTANEOUS
Qty: 0 | Refills: 0 | DISCHARGE

## 2022-05-29 RX ORDER — ONDANSETRON 8 MG/1
4 TABLET, FILM COATED ORAL ONCE
Refills: 0 | Status: COMPLETED | OUTPATIENT
Start: 2022-05-29 | End: 2022-05-29

## 2022-05-29 RX ORDER — CLOPIDOGREL BISULFATE 75 MG/1
1 TABLET, FILM COATED ORAL
Qty: 0 | Refills: 0 | DISCHARGE

## 2022-05-29 RX ADMIN — INSULIN GLARGINE 15 UNIT(S): 100 INJECTION, SOLUTION SUBCUTANEOUS at 22:40

## 2022-05-29 RX ADMIN — SODIUM CHLORIDE 1000 MILLILITER(S): 9 INJECTION INTRAMUSCULAR; INTRAVENOUS; SUBCUTANEOUS at 09:23

## 2022-05-29 RX ADMIN — SODIUM CHLORIDE 500 MILLILITER(S): 9 INJECTION INTRAMUSCULAR; INTRAVENOUS; SUBCUTANEOUS at 11:20

## 2022-05-29 RX ADMIN — SODIUM CHLORIDE 125 MILLILITER(S): 9 INJECTION INTRAMUSCULAR; INTRAVENOUS; SUBCUTANEOUS at 15:45

## 2022-05-29 RX ADMIN — PIPERACILLIN AND TAZOBACTAM 25 GRAM(S): 4; .5 INJECTION, POWDER, LYOPHILIZED, FOR SOLUTION INTRAVENOUS at 22:39

## 2022-05-29 RX ADMIN — Medication 650 MILLIGRAM(S): at 23:58

## 2022-05-29 RX ADMIN — Medication 84 MICROGRAM(S): at 22:40

## 2022-05-29 RX ADMIN — Medication 1000 MILLIGRAM(S): at 15:26

## 2022-05-29 RX ADMIN — PIPERACILLIN AND TAZOBACTAM 200 GRAM(S): 4; .5 INJECTION, POWDER, LYOPHILIZED, FOR SOLUTION INTRAVENOUS at 11:19

## 2022-05-29 RX ADMIN — Medication 3 MILLIGRAM(S): at 23:02

## 2022-05-29 RX ADMIN — Medication 400 MILLIGRAM(S): at 13:17

## 2022-05-29 RX ADMIN — Medication 650 MILLIGRAM(S): at 23:28

## 2022-05-29 RX ADMIN — Medication 2: at 22:39

## 2022-05-29 NOTE — ED PROVIDER NOTE - PHYSICAL EXAMINATION
Well appearing, well nourished, awake, alert, oriented to person, place, time/situation and in no apparent distress.    Airway patent    Eyes without scleral injection. No jaundice.    Strong pulse.    Respirations unlabored.    Abdomen soft, TTP RUQ > LUQ, no guarding.    Spine appears normal, range of motion is not limited, no muscle or joint tenderness.    Alert and oriented, no gross motor or sensory deficits.    Skin normal color for race, warm, dry and intact. No evidence of rash.    No SI/HI.
no

## 2022-05-29 NOTE — ED PROVIDER NOTE - CLINICAL SUMMARY MEDICAL DECISION MAKING FREE TEXT BOX
Maximilian: DDx of older F w/ CAD and DM w/ abd pain: ACS, pancreatitis, DKA, gallstones. Check trop, EKG, CXR, RUQ US, lipase. CT w/ contrast for aortic pathology. Pain/nausea meds. IVF.

## 2022-05-29 NOTE — ED PROVIDER NOTE - OBJECTIVE STATEMENT
Maximilian: Abd pain (sharp) from epig. to throughout abd. Intermittent. 6/10 pain. Since a few days. CAD (stent). Did not respond fully to [employer]. Radiates to back. Recent PCP visit labs unremarkable. No F. No leg pain/swelling. Has nausea. No V/D. Poor appetite. Last BM yest. (normal). Passing gas. No bleeding from bottom.    DM, hypothyroid, HTN, asthma, HL. Takes Plavix (no ASA).     No significant T/E/D.

## 2022-05-29 NOTE — ED ADULT TRIAGE NOTE - CHIEF COMPLAINT QUOTE
p/t c/o of epigastric pain radiating to lower abd for one week, occasional nausea as well, denies vomiting, denies chest pain

## 2022-05-29 NOTE — ED PROVIDER NOTE - ATTENDING CONTRIBUTION TO CARE
I performed a face-to-face evaluation of the patient and performed a history and physical examination. I agree with the history and physical examination. If this was a PA visit, I personally saw the patient with the PA and performed a substantive portion of the visit including all aspects of the medical decision making.    DDx of older F w/ CAD and DM w/ abd pain: ACS, pancreatitis, DKA, gallstones. Check trop, EKG, CXR, RUQ US, lipase. CT w/ contrast for aortic pathology. Pain/nausea meds. IVF.

## 2022-05-29 NOTE — H&P ADULT - ATTENDING COMMENTS
Foreign body perforation of stomach  a.  Admit to B surgery/ DEYANIRA GARCIA  b.  Keep NPO, continue IVF resuscitation  c.  CT reviewed  d.  GI to attempt endoscopic removal of foreign body with possible need for operative management.  Risks, benefits and alternatives discussed with patient  e.  Continue IV antibiotics, PPi

## 2022-05-29 NOTE — H&P ADULT - ASSESSMENT
65yo F with Hx CAD (s/p stent 12yr ago), DM, hypothyroidism, and asthma who presented with epigastric pain for several days, found to have a foreign body perforating distal stomach superiorly, abutting liver. Emergent EGD with GI for removal of foreign body with possible diagnostic laparoscopy, Gianluca patch if EGD unsuccessful.     PLAN:  - Admit to B Team Surgery, Dr. Roman  - GI consulted, plan for emergent EGD for removal of foreign body  - Possible diagnostic laparoscopy, Gianluca patch if EGD unsuccessful -- consent obtained, in chart  - Diet: NPO  - IVF: NS @ 125  - Abx: Diflucan   - IV Pantoprazole 40mg BID  - DM: FS/MISS q6h  - Home Lantus (30u qN) HALVED while NPO -- 15mg qN in hospital  - VTE ppx: Lovenox  - Home meds: Synthroid 56mcg (home dose 112mcg PO) while NPO, symbicort CONTINUED   - Home amlodipine, janumet, montelukast, atorvastatin, gabapentin, plavix HELD while NPO   - AM labs    Discussed with Dr. Calderon, PGY-2  B Team Surgery  #[id number]

## 2022-05-29 NOTE — CONSULT NOTE ADULT - ATTENDING COMMENTS
67 yo obese F pmh CAD, DM who presents with abdominal pain and decrease appetite. On CT imaging - found to have 3.5 cm foreign body penetrating through wall of stomach with part still in lumen.  Leukocytosis but stable vitals.  + TTP on abdominal exam but no rigidity or rebound.  Reasonable to attempt endoscopic removal.  However if unable to find/remove, will likely require surgical intervention.  Discussed directly with surgical attending Ja.  Agree with npo, empiric abx.

## 2022-05-29 NOTE — CONSULT NOTE ADULT - SUBJECTIVE AND OBJECTIVE BOX
HPI:  NOMAN GARCIA is a 66year old female with history of CAD, DM, hypothyroid who presents with abdominal pain.    Patient presents with abdominal pain and found to have metallic object perforating through gastric wall into peritoneum abutting liver.  She endorses abdominal pain and chills.  She does not recall ingesting or swallowing any bones or metallic objects.    ROS:   General:  No fevers, chills, night sweats, fatigue  Eyes:  Good vision, no reported pain  ENT:  No sore throat, pain, runny nose  CV:  No pain, palpitations  Pulm:  No dyspnea, cough  GI:  See HPI, otherwise negative  :  No  incontinence, nocturia  Muscle:  No pain, weakness  Neuro:  No memory problems  Psych:  No insomnia, mood problems, depression  Endocrine:  No polyuria, polydipsia, cold/heat intolerance  Heme:  No petechiae, ecchymosis, easy bruisability  Skin:  No rash    PMHX/PSHX:    Hypothyroid    CAD (coronary artery disease)    DM (diabetes mellitus)    No significant past surgical history      Allergies:  No Known Allergies      Home Medications: reviewed  Hospital Medications:      Social History:   Tobacco: denies  Alcohol: denies  Recreational drugs: denies    Family history:      Denies family history of colon cancer/polyps, stomach cancer/polyps, pancreatic cancer/masses, liver cancer/disease, ovarian cancer and endometrial cancer.    PHYSICAL EXAM:   Vital Signs:  Vital Signs Last 24 Hrs  T(C): 37.2 (29 May 2022 12:22), Max: 37.2 (29 May 2022 12:22)  T(F): 99 (29 May 2022 12:22), Max: 99 (29 May 2022 12:22)  HR: 84 (29 May 2022 12:22) (66 - 84)  BP: 120/66 (29 May 2022 12:22) (120/66 - 144/85)  BP(mean): --  RR: 18 (29 May 2022 12:22) (18 - 18)  SpO2: 100% (29 May 2022 12:22) (99% - 100%)  Daily     Daily     GENERAL: no acute distress, but shivering  NEURO: alert  HEENT: NCAT, no conjunctival pallor appreciated  CHEST: no respiratory distress, no accessory muscle use  CARDIAC: regular rate, +S1/S2  ABDOMEN: soft, nondistended, tender, no rebound or guarding  EXTREMITIES: warm, well perfused  SKIN: no lesions noted    LABS: reviewed                        11.9   19.00 )-----------( 253      ( 29 May 2022 09:15 )             38.4     05-29    137  |  100  |  12  ----------------------------<  134<H>  5.3   |  24  |  0.65    Ca    9.5      29 May 2022 09:15  Mg     1.80     05-29    TPro  7.4  /  Alb  4.0  /  TBili  0.5  /  DBili  x   /  AST  32  /  ALT  28  /  AlkPhos  110  05-29    LIVER FUNCTIONS - ( 29 May 2022 09:15 )  Alb: 4.0 g/dL / Pro: 7.4 g/dL / ALK PHOS: 110 U/L / ALT: 28 U/L / AST: 32 U/L / GGT: x               Diagnostic Studies: see sunrise for full report

## 2022-05-29 NOTE — ED ADULT NURSE REASSESSMENT NOTE - NS ED NURSE REASSESS COMMENT FT1
pt remains alert,oriented x3.  c/o continued abd pains- refusing pain meds. gi to evaluate. repeat labs sent. will continue to monitor

## 2022-05-29 NOTE — PRE-OP CHECKLIST - AS BP NONINV SITE
IR Office Visit / Evaluation    Patient: Zelda Farias Date: 2017   : 2/15/1928 Attending: Celso Lino DO    89 year old female Dr Jacobo     40 minutes of time was spent with this patient with> 50 % of the time spent counseling, coordinating care and clinical decision-making.  Reason for Consult:  Back Pain and Compression fracture    HPI: 88 yo female with CAD, osteoporosis with 5 week history of mid left lateral rib pain.  Pt denies any trauma or lifting.  States the pain just happened.  The pain is a stabbing pain when she gets in and out of a chair , and her bed.  The pain is worse early in the morning when she's trying to get out of bed.  Patient states that she becomes a little bit of short of breath with the pain but is able to breathe through it.  5 weeks ago pain was a 10 on the pain scale today it is 1.  She states that yesterday the pain drastically started to improve and she is no longer taking any narcotics.  Patient states that she took 2 Advil this morning and seems to be doing okay.  Patient denies any numbness or tingling of the upper or lower extremities or bowel or bladder changes.  Patient does have a right shoulder that chronically has pain some able to lift this above her head.     Patient was seen by Dr. Narvaez MRI was done which showed a T7-T8 acute subacute compression fracture with loss of height at T8.  The patient was referred to IR for possible vertebral augmentation.  Patient states she's also had a history of sciatica in Medrol Dosepaks have relieve this pain.  She chronically has pain between her shoulder blades especially when she's standing.  She does have a bit of kyphosis.    Patient Active Problem List    Diagnosis Date Noted   • Spigelian hernia 2016     Priority: Low   • Postsurgical aortocoronary bypass status 2013     Priority: Low   • Coronary artery disease 2013     Priority: Low   • Carotid artery narrowing 05/10/2013     Priority: Low      Bilateral. 30% 10/06.      • Hearing loss 05/10/2013     Priority: Low   • Hypercholesterolemia 05/10/2013     Priority: Low   • Labile hypertension 05/10/2013     Priority: Low     Well controlled at home.      • Hypothyroidism 05/10/2013     Priority: Low   • Osteoarthritis 05/10/2013     Priority: Low   • Osteoporosis 05/10/2013     Priority: Low   • Pain in shoulder 05/10/2013     Priority: Low   • Palpitations 05/10/2013     Priority: Low     PVCs.        Past Medical History   Diagnosis Date   • Arthritis    • CAD (coronary artery disease)      chest pressure,   back pain   • Carbuncle 01/01/2009     urethral   • Cataracts, bilateral    • Heartburn    • [place] (hard of hearing)      wears hearing aides   • Hypertension    • Hypothyroid    • SOB (shortness of breath) on exertion    • Wears dentures    • Wears glasses      reading      Past Surgical History   Procedure Laterality Date   • Dexa bone density axial skeleton  02/10/2004   • Dexa bone density axial skeleton  03/09/2005   • D and c  50 yrs    • Cataract extrac w/ intraocular lens imp&ant vit,bilaterl  2009     bilat   • Coronary angiogram - cv     • Coronary artery bypass graft     • Mammo diagnostic bilateral  12/23/2014   • Ptca     • Cardiac catheterization       Social History   Substance Use Topics   • Smoking status: Never Smoker   • Smokeless tobacco: Never Used   • Alcohol use No      Comment: soical drinker      Patient livesWith her .  She is fairly active    Family History   Problem Relation Age of Onset   • Heart disease Mother    • OTHER Mother       rhemuatic fever   • OTHER Father       cirrhosis   • Cancer Sister    • COPD Sister    • High blood pressure Sister    • Thyroid Sister    • GI Son       Current Outpatient Prescriptions   Medication Sig Dispense Refill   • acetaminophen-codeine (TYLENOL NO.3) 300-30 MG per tablet Take 1 tablet by mouth every 4 hours as needed for Pain. 30 tablet 1   • amLODIPine (NORVASC) 10 MG tablet  Take 10 mg by mouth daily.     • pravastatin (PRAVACHOL) 80 MG tablet Take 1 tablet by mouth nightly. 90 tablet 1   • metoPROLOL (LOPRESSOR) 25 MG tablet Take 1 tablet by mouth every 12 hours. 180 tablet 1   • apixaban (ELIQUIS) 2.5 MG Tab Take 1 tablet by mouth every 12 hours. 180 tablet 3   • levothyroxine (SYNTHROID, LEVOTHROID) 50 MCG tablet Take 1 tablet by mouth daily. 90 tablet 3   • Ibuprofen (ADVIL PO)      • aspirin 81 MG tablet Take 81 mg by mouth daily.     • gabapentin (NEURONTIN) 100 MG capsule Take 1 capsule by mouth 2 times daily. 60 capsule 3   • baclofen (LIORESAL) 10 MG tablet One/half tablet nightly for four to five day then increased to twice daily. 60 tablet 0   • gabapentin (NEURONTIN) 100 MG capsule Take 1 capsule by mouth 2 times daily. Take 1 tablet 2 times daily for the next 3 days then increase to 2 tablets 2 times daily for continued pain relief. Do not increase the medications if you are feeling dizzy on the 100 mg dose. Do not use Tylenol with Codeine at the same time you're using this medication. 30 capsule 0     No current facility-administered medications for this visit.       ALLERGIES:   Allergen Reactions   • Tape [Adhesive]      ITCH      Review of Systems:  Patient denies nausea and vomiting, tolerating oral intake  No fevers overnight  No chest pain or shortness of breath      Vital signs in last 24 hours:  Visit Vitals   • /58   • Pulse 74        Physical Exam:  General Appearance:    Alert and oriented x3 , cooperative, no distress, appears stated age    Head:    Normocephalic, without obvious abnormality, atraumatic   Eyes:    PERRL, conjunctiva clear, EOM's intact   Ears:   decreased  hearing both ears, hearing aids in place   Nose:   Nares normal, septum midline, mucosa normal, no drainage      Throat:   Lips, mucosa, and tongue normal; teeth and gums normal   Neck:   Supple, symmetrical, trachea midline   Back:     Symmetric,moderate  curvature, ROM normal, patient  able to bend over and touch her toes.  Patient has no pain to palpation of spinous processes.  She does have mild amount of achiness in the left lateral rib cage.  She states that pressure in that area actually makes it feel better.     Lungs:     Clear to auscultation bilaterally, respirations unlabored    Heart:    Regular rate and rhythm, S1  S2 normal, no murmur, rub or gallop   Extremities:   Extremities normal, atraumatic, no cyanosis or edema,     Pulses:   2+ and symmetric upper extremities   Skin:   Skin color, texture, turgor normal, no rashes or lesions   Neurologic:   CNII-XII intact, normal strength, sensation   throughout   Laboratory Results:  Lab Results   Component Value Date    SODIUM 142 10/21/2016    POTASSIUM 4.0 10/21/2016    BUN 23 (H) 10/21/2016    CREATININE 0.84 10/21/2016    WBC 5.9 09/20/2016    HCT 37.6 09/20/2016    HGB 12.4 09/20/2016     09/20/2016    INR 1.1 02/25/2016    FBGN 262 06/15/2013    RAPDTR 0.05 (HH) 02/25/2016    GLUCOSE 98 10/21/2016    TSH 10.040 (H) 10/21/2016    MG 1.6 02/25/2016    AST 31 02/25/2016       The above labs were reviewed by the writer.   Diagnostic Results:     Mri Thoracic Spine    Result Date: 2/20/2017  MRI THORACIC SPINE WO CONTRAST HISTORY: ABNORMAL XRAY, THORACIC SPINE, BONE DESTRUCTION, SPINE FRACTURE, PATHOLOGICAL, THORACIC COMPARISON:  X-rays from June 2, 2016 TECHNIQUE:  Sagittal T1, T2, STIR, axial T1, and axial T2 images are obtained of the thoracic spine.     FINDINGS:  The alignment of the thoracic spine is anatomic. There is loss of the normal marrow signal in the T8 vertebral body with approximately 50% vertebral body height loss and diffuse edema on the STIR sequences. Similar marrow signal changes visible in the anterior inferior aspect of the T7 vertebral body, without significant vertebral body height loss. The sinuses are consistent with acute/subacute fractures. No other fracture or destructive bone lesions identified. There  is mild multilevel degenerative disc disease. Mild disc bulge at T7-T8, in conjunction with the T8 vertebral body height loss, results in moderate right foraminal narrowing. Mild bilateral foraminal narrowing is present at T8-T9, related to the T8 vertebral body height loss. A shallow right foraminal disc protrusion results in mild to moderate right foraminal narrowing at T12-L1. There is no other significant central or foraminal narrowing at any level in the thoracic spine. The thoracic aorta is normal in caliber.     IMPRESSION: 1. Acute/subacute compression fracture at the T8 level, with approximately 50% loss of vertebral body height. 2. Acute/subacute inferior endplate fracture at the T7 level, without significant vertebral body height loss. 3. Normal anatomic alignment. 4. Multilevel degenerative disease, as described above.     Imaging studies were reviewed with the patient  ASSESSMENT/ PLAN  89-year-old female with a history of osteoporosis with left lateral rib cage area pain MRI indicating T7, T8 fractures with T8 loss of height.  Patient has no pain to palpation in this area , she states that yesterday her pain significantly started to improve.  Likely that she is starting to heal on her own.  Patient encouraged to use heat therapy, she is only taking ibuprofen as needed.    My contact information was given to the patient in case her pain would worsen, I did talk about vertebral augmentation and the indications for this.    Indication for procedure None at this time   right upper arm

## 2022-05-29 NOTE — BRIEF OPERATIVE NOTE - NSICDXBRIEFPROCEDURE_GEN_ALL_CORE_FT
PROCEDURES:  Diagnostic laparoscopy 29-May-2022 20:18:22  Lulu Munoz  Removal of foreign body from abdomen 29-May-2022 20:20:00  Lulu Munoz

## 2022-05-29 NOTE — CHART NOTE - NSCHARTNOTEFT_GEN_A_CORE
Preliminary endoscopy report.  Erythema with focal erosions seen in stomach without identifiable foreign body or mucosal perforation/defect.  Discussed with General Surgery attending at bedside, who is proceeding with ex lap.  No further recommendations or GI interventions at this time.  Full endoscopy report to follow.    Recommendations incomplete until finalized by attending signature/attestation to note.    Ganesh Cano, PGY-4  GI/Hepatology Fellow    MONDAY-FRIDAY 8AM-5PM:  Pager# [phone] ([facility]) or [phone] ([facility])    NON-URGENT CONSULTS:  Please email [email or web address]  AT NIGHT AND ON WEEKENDS:  Contact on-call GI fellow via answering service ([phone]) from 5pm-8am and on weekends/holidays

## 2022-05-29 NOTE — ED PROVIDER NOTE - NSICDXPASTMEDICALHX_GEN_ALL_CORE_FT
PAST MEDICAL HISTORY:  CAD (coronary artery disease)     DM (diabetes mellitus)     Hypothyroid

## 2022-05-29 NOTE — CONSULT NOTE ADULT - ASSESSMENT
66year old female with history of CAD, DM, hypothyroid who presents with abdominal pain.    # Foreign body perforating gastric wall into peritoneum abutting liver  # Leukocytosis  # Chills    Recommendations:  -keep NPO for emergent EGD  -STAT COVID  -recommend general surgery for backup pending endoscopy results in OR    Recommendations incomplete until finalized by attending signature/attestation to note.    Ganesh Cano, PGY-4  GI/Hepatology Fellow    MONDAY-FRIDAY 8AM-5PM:  Pager# [phone] ([facility]) or [phone] ([facility])    NON-URGENT CONSULTS:  Please email [email or web address]  AT NIGHT AND ON WEEKENDS:  Contact on-call GI fellow via answering service ([phone]) from 5pm-8am and on weekends/holidays

## 2022-05-29 NOTE — ED PROVIDER NOTE - PROGRESS NOTE DETAILS
Jordin Mendoza M.D. (PGY-1): ruq negative, followed up w/ ct, e/o linear metallic FB in the stomach, perforating into the liver. zosyn and blood cx. gi and surgery consult. gi to take back. admission to surgery. pt clinically stable.

## 2022-05-29 NOTE — BRIEF OPERATIVE NOTE - OPERATION/FINDINGS
Succus noted near the lesser curvature of the stomach. Large fishbone removed. No gastric perforation noted. Abdomen irrigated and suctioned.  Hemostasis achieved.

## 2022-05-29 NOTE — H&P ADULT - HISTORY OF PRESENT ILLNESS
67yo F with Hx CAD (s/p stent 12yr ago), DM, hypothyroidism, and asthma who presented with epigastric pain for several days. Patient thought that the pain was initially due to gas and tried to take GasEx without relief. She is mildly nauseous, but has not had any episodes of emesis. She continues to pass flatus and have BM as normal. She is tolerating regular diet and last ate yesterday at 5pm. Patient denies recent ingestion of foreign bodies. She ate fish recently but states that she is very careful about ingesting bones. On presentation, patient was afebrile, hemodynamically stable. Labs significant for leukocytosis to 19 and lactate of 2.5 with subsequent improvement to 1.8 after administration of 1.5L IVF. CT AP demonstrated a foreign body perforating distal stomach superiorly, abutting liver.     Patient states that she has never had anything similar happen before. She had an open appendectomy 30 years ago. She last had a colonoscopy 3 years ago with polyps removed and was told to follow-up again this year. She has never had an endoscopy procedure performed.

## 2022-05-29 NOTE — ED ADULT NURSE NOTE - OBJECTIVE STATEMENT
pt to rm 26. alert,oriented x3. reports abd pains x few days with decreased appetite. pain meds offered. pt declines states "pain not much at present" iv access,labs sent. will continue to  monitor

## 2022-05-29 NOTE — H&P ADULT - NSHPPHYSICALEXAM_GEN_ALL_CORE
GEN: resting in bed comfortably in NAD  NEURO: awake, alert  HEENT: normocephalic, atraumatic, (-) scleral icterus  CV: warm, well-perfused  CHEST: bilateral equal chest wall rise  RESP: no increased WOB  ABD: soft, non-distended, tender to palpation in all quadrants without rebound tenderness or guarding  EXTR: no gross deformities; spontaneous movement in b/l U/L extrem   SKIN: non-jaundiced

## 2022-05-29 NOTE — H&P ADULT - NSICDXPASTMEDICALHX_GEN_ALL_CORE_FT
PAST MEDICAL HISTORY:  CAD (coronary artery disease) s/p stent 2010    DM (diabetes mellitus)     Hypothyroid

## 2022-05-29 NOTE — H&P ADULT - NSHPLABSRESULTS_GEN_ALL_CORE
LABS:              11.9   19.00 )-----------( 253      ( 29 May 2022 09:15 )             38.4     IMAGING:  < from: CT Abdomen and Pelvis w/ IV Cont (05.29.22 @ 11:03) >    FINDINGS:  LOWER CHEST: Small hiatal hernia. Minor atelectasis.    LIVER: Within normal limits.  BILE DUCTS: Normal caliber.  GALLBLADDER: Within normal limits.  SPLEEN: Within normal limits.  PANCREAS: Within normal limits.  ADRENALS: Within normal limits.  KIDNEYS/URETERS: Within normal limits.    BLADDER: Within normal limits.  REPRODUCTIVE ORGANS: Uterus and adnexa within normal limits.    BOWEL: Approximately 3.5 cm linear hyperdensity extending superiorly from   distal stomach lumen and into the peritoneum abutting the liver. There is   adjacent thickening of the gastric wall. No bowel obstruction.   Appendectomy.  PERITONEUM: No ascites or pneumoperitoneum. No drainable fluid collection.  VESSELS: Within normal limits. Retroaortic left renal vein.  RETROPERITONEUM/LYMPH NODES: No lymphadenopathy.  ABDOMINAL WALL: Within normal limits.  BONES: Within normal limits.    IMPRESSION:  Perforating ingested foreign object, such as metal antonio from a BBQ brush   or bone, extending from the distal stomach superiorly and abutting the   liver.    No evidence of drainable intra-abdominal fluid collection.    < end of copied text >

## 2022-05-29 NOTE — ED ADULT NURSE REASSESSMENT NOTE - NS ED NURSE REASSESS COMMENT FT1
pt remains alert,oriented x3. eval by gi,surgery. npo in effect, meds as ordered c/o abd pains. will continue to monitor

## 2022-05-29 NOTE — PATIENT PROFILE ADULT - NSPROPASSIVESMOKEEXPOSURE_GEN_A_NUR
Venkat Araiza is a 51 y.o. female evaluated via telephone on 9/2/2020. Consent:  She and/or health care decision maker is aware that that she may receive a bill for this telephone service, depending on her insurance coverage, and has provided verbal consent to proceed: Yes      Documentation:  I communicated with the patient and/or health care decision maker about smoking, COPD, hosp f/u. Details of this discussion including any medical advice provided:    Some SOB and wheezing. Improving since hospitalization but not at baseline. Using Symbicort and Duonebs. PFTs showed Severe COPD. Start Spiriva and stop atrovent. CHange duonebs to albuterol nebs  Continue Symbicort    We discussed smoking cessation for >3 minutes. We discussed the association of smoking with the patient's current symptoms and the risks of continued use and the options for achieving cessation. The patient was advised to set a quit date and alert family members and remove smoking paraphernalia before the quit date. She currently is in the contemplative phase of quitting and will continue to consider specific actions. Depression controlled. Denies HI. Start Chantix. F/u in 4 months    I affirm this is a Patient Initiated Episode with a Patient who has not had a related appointment within my department in the past 7 days or scheduled within the next 24 hours.     Patient identification was verified at the start of the visit: Yes    Total Time: minutes: 11-20 minutes    Note: not billable if this call serves to triage the patient into an appointment for the relevant concern      HA HOLLEY
No

## 2022-05-30 ENCOUNTER — TRANSCRIPTION ENCOUNTER (OUTPATIENT)
Age: 67
End: 2022-05-30

## 2022-05-30 VITALS
DIASTOLIC BLOOD PRESSURE: 50 MMHG | HEART RATE: 70 BPM | SYSTOLIC BLOOD PRESSURE: 100 MMHG | TEMPERATURE: 98 F | RESPIRATION RATE: 18 BRPM | OXYGEN SATURATION: 100 %

## 2022-05-30 LAB
A1C WITH ESTIMATED AVERAGE GLUCOSE RESULT: 7.5 % — HIGH (ref 4–5.6)
ANION GAP SERPL CALC-SCNC: 10 MMOL/L — SIGNIFICANT CHANGE UP (ref 7–14)
APTT BLD: 26.4 SEC — LOW (ref 27–36.3)
BUN SERPL-MCNC: 10 MG/DL — SIGNIFICANT CHANGE UP (ref 7–23)
CALCIUM SERPL-MCNC: 8.2 MG/DL — LOW (ref 8.4–10.5)
CHLORIDE SERPL-SCNC: 106 MMOL/L — SIGNIFICANT CHANGE UP (ref 98–107)
CO2 SERPL-SCNC: 23 MMOL/L — SIGNIFICANT CHANGE UP (ref 22–31)
CREAT SERPL-MCNC: 0.69 MG/DL — SIGNIFICANT CHANGE UP (ref 0.5–1.3)
EGFR: 95 ML/MIN/1.73M2 — SIGNIFICANT CHANGE UP
ESTIMATED AVERAGE GLUCOSE: 169 — SIGNIFICANT CHANGE UP
GLUCOSE BLDC GLUCOMTR-MCNC: 169 MG/DL — HIGH (ref 70–99)
GLUCOSE BLDC GLUCOMTR-MCNC: 242 MG/DL — HIGH (ref 70–99)
GLUCOSE SERPL-MCNC: 181 MG/DL — HIGH (ref 70–99)
HCT VFR BLD CALC: 33.5 % — LOW (ref 34.5–45)
HCV AB S/CO SERPL IA: 0.1 S/CO — SIGNIFICANT CHANGE UP (ref 0–0.99)
HCV AB SERPL-IMP: SIGNIFICANT CHANGE UP
HGB BLD-MCNC: 10.4 G/DL — LOW (ref 11.5–15.5)
INR BLD: 1.33 RATIO — HIGH (ref 0.88–1.16)
MAGNESIUM SERPL-MCNC: 1.8 MG/DL — SIGNIFICANT CHANGE UP (ref 1.6–2.6)
MCHC RBC-ENTMCNC: 24.7 PG — LOW (ref 27–34)
MCHC RBC-ENTMCNC: 31 GM/DL — LOW (ref 32–36)
MCV RBC AUTO: 79.6 FL — LOW (ref 80–100)
NRBC # BLD: 0 /100 WBCS — SIGNIFICANT CHANGE UP
NRBC # FLD: 0 K/UL — SIGNIFICANT CHANGE UP
PHOSPHATE SERPL-MCNC: 3.2 MG/DL — SIGNIFICANT CHANGE UP (ref 2.5–4.5)
PLATELET # BLD AUTO: 191 K/UL — SIGNIFICANT CHANGE UP (ref 150–400)
POTASSIUM SERPL-MCNC: 4 MMOL/L — SIGNIFICANT CHANGE UP (ref 3.5–5.3)
POTASSIUM SERPL-SCNC: 4 MMOL/L — SIGNIFICANT CHANGE UP (ref 3.5–5.3)
PROTHROM AB SERPL-ACNC: 15.5 SEC — HIGH (ref 10.5–13.4)
RBC # BLD: 4.21 M/UL — SIGNIFICANT CHANGE UP (ref 3.8–5.2)
RBC # FLD: 17 % — HIGH (ref 10.3–14.5)
SODIUM SERPL-SCNC: 139 MMOL/L — SIGNIFICANT CHANGE UP (ref 135–145)
WBC # BLD: 9.19 K/UL — SIGNIFICANT CHANGE UP (ref 3.8–10.5)
WBC # FLD AUTO: 9.19 K/UL — SIGNIFICANT CHANGE UP (ref 3.8–10.5)

## 2022-05-30 PROCEDURE — 99231 SBSQ HOSP IP/OBS SF/LOW 25: CPT

## 2022-05-30 RX ORDER — PANTOPRAZOLE SODIUM 20 MG/1
40 TABLET, DELAYED RELEASE ORAL EVERY 24 HOURS
Refills: 0 | Status: DISCONTINUED | OUTPATIENT
Start: 2022-05-30 | End: 2022-05-30

## 2022-05-30 RX ORDER — PANTOPRAZOLE SODIUM 20 MG/1
1 TABLET, DELAYED RELEASE ORAL
Qty: 21 | Refills: 0
Start: 2022-05-30 | End: 2022-06-19

## 2022-05-30 RX ORDER — PANTOPRAZOLE SODIUM 20 MG/1
1 TABLET, DELAYED RELEASE ORAL
Qty: 0 | Refills: 0 | DISCHARGE

## 2022-05-30 RX ADMIN — SODIUM CHLORIDE 125 MILLILITER(S): 9 INJECTION INTRAMUSCULAR; INTRAVENOUS; SUBCUTANEOUS at 11:42

## 2022-05-30 RX ADMIN — Medication 1: at 09:12

## 2022-05-30 RX ADMIN — PANTOPRAZOLE SODIUM 40 MILLIGRAM(S): 20 TABLET, DELAYED RELEASE ORAL at 05:45

## 2022-05-30 RX ADMIN — Medication 2: at 12:39

## 2022-05-30 RX ADMIN — PIPERACILLIN AND TAZOBACTAM 25 GRAM(S): 4; .5 INJECTION, POWDER, LYOPHILIZED, FOR SOLUTION INTRAVENOUS at 05:45

## 2022-05-30 RX ADMIN — ENOXAPARIN SODIUM 40 MILLIGRAM(S): 100 INJECTION SUBCUTANEOUS at 05:45

## 2022-05-30 RX ADMIN — BUDESONIDE AND FORMOTEROL FUMARATE DIHYDRATE 2 PUFF(S): 160; 4.5 AEROSOL RESPIRATORY (INHALATION) at 09:15

## 2022-05-30 NOTE — DISCHARGE NOTE PROVIDER - NSDCCPTREATMENT_GEN_ALL_CORE_FT
PRINCIPAL PROCEDURE  Procedure: Diagnostic laparoscopy  Findings and Treatment: WOUND CARE: You may shower. Pat Dry abdomen. Leave the white steri strips in place, they will fall off on their own in approximately 5-7 days.   ACTIVITY: No heavy lifting anything more than 10-15lbs or straining. Otherwise, you may return to your usual level of physical activity. If you are taking narcotic pain medication (such as Percocet), do NOT drive a car, operate machinery or make important decisions.  DIET: Soft regular diet   ANTIBIOTICS: Please take the antibiotics (augmentin) and PPI (omeprazole) as prescribed  NOTIFY YOUR SURGEON IF: You have any bleeding that does not stop, any pus draining from your wound, any fever (over 100.4 F) or chills, persistent nausea/vomiting with inability to tolerate food or liquids, persistent diarrhea, or if your pain is not controlled on your discharge pain medications.  FOLLOW-UP:  1. Please call to make a follow-up appointment within one week of discharge   2. Please follow up with your primary care physician in one week regarding your hospitalization.        SECONDARY PROCEDURE  Procedure: Removal of foreign body from abdomen  Findings and Treatment:

## 2022-05-30 NOTE — DISCHARGE NOTE NURSING/CASE MANAGEMENT/SOCIAL WORK - PATIENT PORTAL LINK FT
You can access the FollowMyHealth Patient Portal offered by [facility] by registering at the following website: [email or web address]. By joining [facility]’s FollowMyHealth portal, you will also be able to view your health information using other applications (apps) compatible with our system.

## 2022-05-30 NOTE — PROGRESS NOTE ADULT - SUBJECTIVE AND OBJECTIVE BOX
Interval Events:   Ex lap following endoscopy yesterday with removal of long fish bone.  Patient states she is feeling better this morning, pain currently controlled.    ROS:   12 point review of systems performed and negative except otherwise noted in HPI.    Hospital Medications:  budesonide 160 MICROgram(s)/formoterol 4.5 MICROgram(s) Inhaler 2 Puff(s) Inhalation two times a day  dextrose 5%. 1000 milliLiter(s) IV Continuous <Continuous>  dextrose 5%. 1000 milliLiter(s) IV Continuous <Continuous>  dextrose 5%. 1000 milliLiter(s) IV Continuous <Continuous>  dextrose 5%. 1000 milliLiter(s) IV Continuous <Continuous>  dextrose 50% Injectable 25 Gram(s) IV Push once  dextrose 50% Injectable 12.5 Gram(s) IV Push once  dextrose 50% Injectable 25 Gram(s) IV Push once  dextrose 50% Injectable 25 Gram(s) IV Push once  dextrose 50% Injectable 12.5 Gram(s) IV Push once  dextrose 50% Injectable 25 Gram(s) IV Push once  dextrose Oral Gel 15 Gram(s) Oral once PRN  dextrose Oral Gel 15 Gram(s) Oral once PRN  enoxaparin Injectable 40 milliGRAM(s) SubCutaneous every 24 hours  glucagon  Injectable 1 milliGRAM(s) IntraMuscular once  glucagon  Injectable 1 milliGRAM(s) IntraMuscular once  insulin glargine Injectable (LANTUS) 15 Unit(s) SubCutaneous at bedtime  insulin lispro (ADMELOG) corrective regimen sliding scale   SubCutaneous three times a day before meals  insulin lispro (ADMELOG) corrective regimen sliding scale   SubCutaneous at bedtime  levothyroxine Injectable 84 MICROGram(s) IV Push at bedtime  pantoprazole  Injectable 40 milliGRAM(s) IV Push every 24 hours  piperacillin/tazobactam IVPB. 3.375 Gram(s) IV Intermittent once  piperacillin/tazobactam IVPB.. 3.375 Gram(s) IV Intermittent every 8 hours  sodium chloride 0.9%. 1000 milliLiter(s) IV Continuous <Continuous>      PHYSICAL EXAM:   Vital Signs:  Vital Signs Last 24 Hrs  T(C): 36.4 (30 May 2022 06:00), Max: 37.2 (29 May 2022 12:22)  T(F): 97.6 (30 May 2022 06:00), Max: 99 (29 May 2022 12:22)  HR: 74 (30 May 2022 06:00) (66 - 84)  BP: 120/59 (30 May 2022 06:00) (106/34 - 141/46)  BP(mean): 63 (29 May 2022 20:57) (52 - 66)  RR: 18 (30 May 2022 06:00) (9 - 19)  SpO2: 98% (30 May 2022 06:00) (91% - 100%)  Daily Height in cm: 154.94 (29 May 2022 17:15)    Daily     GENERAL: no acute distress  NEURO: alert  HEENT: NCAT, no conjunctival pallor appreciated  CHEST: no respiratory distress, no accessory muscle use  CARDIAC: regular rate, +S1/S2  ABDOMEN: incision from recent ex lap present, soft, nondistended, nontender, no rebound or guarding  EXTREMITIES: warm, well perfused  SKIN: no lesions noted    LABS: reviewed                        10.4   9.19  )-----------( 191      ( 30 May 2022 06:00 )             33.5     05-30    139  |  106  |  10  ----------------------------<  181<H>  4.0   |  23  |  0.69    Ca    8.2<L>      30 May 2022 06:00  Phos  3.2     05-30  Mg     1.80     05-30    TPro  7.4  /  Alb  4.0  /  TBili  0.5  /  DBili  x   /  AST  32  /  ALT  28  /  AlkPhos  110  05-29    LIVER FUNCTIONS - ( 29 May 2022 09:15 )  Alb: 4.0 g/dL / Pro: 7.4 g/dL / ALK PHOS: 110 U/L / ALT: 28 U/L / AST: 32 U/L / GGT: x             Interval Diagnostic Studies: see sunrise for full report

## 2022-05-30 NOTE — DISCHARGE NOTE PROVIDER - HOSPITAL COURSE
67yo F with Hx CAD (s/p stent 12yr ago), DM, hypothyroidism, and asthma who presented with epigastric pain for several days. Patient thought that the pain was initially due to gas and tried to take GasEx without relief. She is mildly nauseous, but has not had any episodes of emesis. She continues to pass flatus and have BM as normal. She is tolerating regular diet and last ate yesterday at 5pm. Patient denies recent ingestion of foreign bodies. She ate fish recently but states that she is very careful about ingesting bones. On presentation, patient was afebrile, hemodynamically stable. Labs significant for leukocytosis to 19 and lactate of 2.5 with subsequent improvement to 1.8 after administration of 1.5L IVF. CT AP demonstrated a foreign body perforating distal stomach superiorly, abutting liver.     Initially, endoscopic retrieval of foreign body was attempted but unsuccessful, an ulcer was identifed on EGD.  Subsequently, a laparoscopy identified and retrieved a single fish bone, the area was irrigated and the patient proceeded to the recovery room without issue.  On POD1 the patient was tolerating CLD and was advanced to soft regular diet and dishcharged home, she was hemodynamically stable, voiding, ambulating, tolerating a regular diet, and her pain was controlled.

## 2022-05-30 NOTE — CHART NOTE - NSCHARTNOTEFT_GEN_A_CORE
Surgery Post-Op Note    Pre-Op Dx: Foreign body aspiration  Procedure: Diagnostic laparoscopy, removal of foreign body from abdomen    SUBJECTIVE:  Pt seen and examined at the bedside. Pt w/ no complaints. Denies F/C/N/V. No chest pain or shortness of breath. Pain controlled with medication.     OBJECTIVE:  Vital Signs Last 24 Hrs  T(C): 36.7 (29 May 2022 22:45), Max: 37.2 (29 May 2022 12:22)  T(F): 98 (29 May 2022 22:45), Max: 99 (29 May 2022 12:22)  HR: 75 (29 May 2022 22:45) (66 - 84)  BP: 123/58 (29 May 2022 22:45) (106/34 - 144/85)  BP(mean): 63 (29 May 2022 20:57) (52 - 66)  RR: 18 (29 May 2022 22:45) (9 - 19)  SpO2: 99% (29 May 2022 22:45) (91% - 100%)    Physical Exam:  General: NAD, resting comfortably in bed  Neuro: A/O x 3, no focal deficits  Pulmonary: Nonlabored breathing, no respiratory distress  Cardiovascular: NSR  Abdominal: soft, NTTP, ND  Incision: C/D/I   Extremities: WWP    LABS:                        11.9   19.00 )-----------( 253      ( 29 May 2022 09:15 )             38.4         137  |  100  |  12  ----------------------------<  134<H>  5.3   |  24  |  0.65    Ca    9.5      29 May 2022 09:15  Mg     1.80         TPro  7.4  /  Alb  4.0  /  TBili  0.5  /  DBili  x   /  AST  32  /  ALT  28  /  AlkPhos  110      PT/INR - ( 29 May 2022 09:15 )   PT: 12.7 sec;   INR: 1.09 ratio         PTT - ( 29 May 2022 09:15 )  PTT:29.7 sec  CAPILLARY BLOOD GLUCOSE      POCT Blood Glucose.: 160 mg/dL (29 May 2022 22:25)  POCT Blood Glucose.: 133 mg/dL (29 May 2022 20:15)  POCT Blood Glucose.: 97 mg/dL (29 May 2022 16:51)  POCT Blood Glucose.: 120 mg/dL (29 May 2022 14:37)    Urinalysis Basic - ( 29 May 2022 09:30 )    Color: Light Yellow / Appearance: Clear / S.013 / pH: x  Gluc: x / Ketone: Negative  / Bili: Negative / Urobili: <2 mg/dL   Blood: x / Protein: Negative / Nitrite: Negative   Leuk Esterase: Moderate / RBC: 0 /HPF / WBC 8 /HPF   Sq Epi: x / Non Sq Epi: 3 /HPF / Bacteria: Many      LIVER FUNCTIONS - ( 29 May 2022 09:15 )  Alb: 4.0 g/dL / Pro: 7.4 g/dL / ALK PHOS: 110 U/L / ALT: 28 U/L / AST: 32 U/L / GGT: x           ABO Interpretation: B ( @ 17:38)      IMAGING:    ASSESSMENT:66y Female now 4hours s/p diagnostic lap and removal of foreign body from abdomen. Recovering appropriately.     PLAN:  - Pain control  - Encourage IS  - Nausea control PRN  - Monitor vitals  - Diet: CLD  - Monitor I+Os  - OOB/ Ambulate  - DVT ppx: lovenox      Team Surgery

## 2022-05-30 NOTE — DISCHARGE NOTE PROVIDER - NSDCMRMEDTOKEN_GEN_ALL_CORE_FT
amLODIPine 5 mg oral tablet: 1 tab(s) orally once a day  atorvastatin 20 mg oral tablet: 1 tab(s) orally once a day  Basaglar KwikPen 100 units/mL subcutaneous solution: 30 unit(s) subcutaneous once a day (at bedtime)  gabapentin 300 mg oral capsule: 1 cap(s) orally 3 times a day  Janumet 50 mg-1000 mg oral tablet: 1 tab(s) orally 2 times a day  levothyroxine 112 mcg (0.112 mg) oral tablet: 1 tab(s) orally once a day  montelukast 10 mg oral tablet: 1 tab(s) orally once a day  pantoprazole 40 mg oral delayed release tablet: 1 tab(s) orally once a day  Plavix 75 mg oral tablet: 1 tab(s) orally once a day  Symbicort 160 mcg-4.5 mcg/inh inhalation aerosol: 2 puff(s) inhaled 2 times a day, As Needed   amLODIPine 5 mg oral tablet: 1 tab(s) orally once a day  amoxicillin-clavulanate 875 mg-125 mg oral tablet: 1 tab(s) orally 2 times a day   atorvastatin 20 mg oral tablet: 1 tab(s) orally once a day  Basaglar KwikPen 100 units/mL subcutaneous solution: 30 unit(s) subcutaneous once a day (at bedtime)  gabapentin 300 mg oral capsule: 1 cap(s) orally 3 times a day  Janumet 50 mg-1000 mg oral tablet: 1 tab(s) orally 2 times a day  levothyroxine 112 mcg (0.112 mg) oral tablet: 1 tab(s) orally once a day  montelukast 10 mg oral tablet: 1 tab(s) orally once a day  pantoprazole 40 mg oral delayed release tablet: 1 tab(s) orally once a day  Plavix 75 mg oral tablet: 1 tab(s) orally once a day  Symbicort 160 mcg-4.5 mcg/inh inhalation aerosol: 2 puff(s) inhaled 2 times a day, As Needed

## 2022-05-30 NOTE — DISCHARGE NOTE PROVIDER - CARE PROVIDER_API CALL
Dimitry Roman)  Surgery; Surgical Critical Care  [address] [place] [address]  Phone: [phone]  Fax: [phone]  Follow Up Time:

## 2022-05-30 NOTE — PROGRESS NOTE ADULT - ASSESSMENT
ASSESSMENT:66y Female now 4hours s/p diagnostic lap and removal of foreign body from abdomen. Recovering appropriately.     PLAN:  - Pain control  - Encourage IS  - Nausea control PRN  - Monitor vitals  - Diet: CLD  - Monitor I+Os  - OOB/ Ambulate  - DVT ppx: lovenox   - Possible DC today     Team Surgery. ASSESSMENT:65yo F with Hx CAD (s/p stent 12yr ago), DM, hypothyroidism, and asthma who presented with epigastric pain for several days, found to have a foreign body perforating distal stomach superiorly, abutting liver. EGD with was unable to locate foreign body. Patient is now s/p diagnostic lap and removal of foreign body from abdomen (5/29). Recovering appropriately.     PLAN:  - Pain control  - Encourage IS  - Nausea control PRN  - Monitor vitals  - Diet: CLD  - Monitor I+Os  - OOB/ Ambulate  - DVT ppx: lovenox   - Possible DC today     Team Surgery. 67yo F with Hx CAD (s/p stent 12yr ago), DM, hypothyroidism, and asthma who presented with epigastric pain for several days, found to have a foreign body perforating distal stomach superiorly, abutting liver. EGD with was unable to locate foreign body. Patient is now s/p diagnostic lap and removal of foreign body from abdomen (5/29).    Plan:  - Pain control  - Encourage IS  - Nausea control prn  - Continue abx: Zosyn in hospital, dc with 4 days of Augmentin  - PPI - dc with protonix  - Monitor vitals  - Diet: Soft diet  - Monitor I+Os  - OOB/ Ambulate  - DVT ppx: lovenox   - DC today      B Team Surgery  [id number]

## 2022-05-30 NOTE — PROGRESS NOTE ADULT - ASSESSMENT
66 year old female with history of CAD, DM, hypothyroid who presents with abdominal pain.    # Foreign body/fishbone perforating gastric wall into peritoneum abutting liver  # Leukocytosis  # Chills  Underwent endoscopy that revealed erythema with focal erosions seen in stomach without identifiable foreign body or mucosal perforation/defect.  Discussed with General Surgery who proceeding with ex lap and removed large fishbone.      Recommendations:  -see endoscopic and surgical findings noted above  -no further plans for anticipated inpatient GI interventions  -will sign off at this time, please call back with questions or if new issues arise    Recommendations incomplete until finalized by attending signature/attestation to note.    Ganesh Cano, PGY-4  GI/Hepatology Fellow    MONDAY-FRIDAY 8AM-5PM:  Pager# [phone] ([facility]) or [phone] ([facility])    NON-URGENT CONSULTS:  Please email [email or web address]  AT NIGHT AND ON WEEKENDS:  Contact on-call GI fellow via answering service ([phone]) from 5pm-8am and on weekends/holidays

## 2022-05-30 NOTE — PROGRESS NOTE ADULT - ATTENDING COMMENTS
s/p EGD yesterday -> unable to find intraluminal side of fishbone  Went for ex-lap -> saw fishbone and removed  Doing well this am, some tenderness at incision, otherwise feels better than presentation  Tolerating breakfast this AM    Rest of care as per surgical team  GI to s/o, callw ith any ?
I have personally interviewed and examined this patient, reviewed pertinent labs and imaging, and discussed the case with colleagues, residents, and physician assistants on B Team rounds.  More than 50% of this 35 minute encounter including face to face with the patient was spent counseling and/or coordination of care on gastric perforation.  Time included review of vitals, labs, imaging, discussion with consultants and coordination with the operating room/emergency department.    65yo F admitted with gastric perforation by fish bone, s/p laparoscopic removal of bone. Pt reports pain is improved after surgery. Tolerated clears. Awaiting soft diet.     - D/C home if tolerates soft diet   - Continue abx x 3d   - Continue PPI x1mo   - Follow up in 2 weeks.     The active care issues are:  1. gastric perforation by foreign body     The Acute Care Surgery (B Team) Attending Group Practice:  Dr. Zaria Manriquez    urgent issues - spectra [phone]  nonurgent issues - [phone]  patient appointments or afterhours - [phone]

## 2022-05-30 NOTE — DISCHARGE NOTE NURSING/CASE MANAGEMENT/SOCIAL WORK - NSDCPEFALRISK_GEN_ALL_CORE
For information on Fall & Injury Prevention, visit: [email or web address] OR  [email or web address] OR  https://www.cdc.gov/steadi/patient.html

## 2022-06-03 LAB
CULTURE RESULTS: SIGNIFICANT CHANGE UP
CULTURE RESULTS: SIGNIFICANT CHANGE UP
SPECIMEN SOURCE: SIGNIFICANT CHANGE UP
SPECIMEN SOURCE: SIGNIFICANT CHANGE UP

## 2022-06-13 LAB — SURGICAL PATHOLOGY STUDY: SIGNIFICANT CHANGE UP

## 2022-06-14 ENCOUNTER — APPOINTMENT (OUTPATIENT)
Dept: TRAUMA SURGERY | Facility: HOSPITAL | Age: 67
End: 2022-06-14

## 2022-06-14 VITALS
TEMPERATURE: 97.3 F | BODY MASS INDEX: 29.43 KG/M2 | WEIGHT: 146 LBS | HEIGHT: 59 IN | SYSTOLIC BLOOD PRESSURE: 131 MMHG | DIASTOLIC BLOOD PRESSURE: 69 MMHG | HEART RATE: 89 BPM

## 2022-06-18 PROBLEM — I25.10 ATHEROSCLEROTIC HEART DISEASE OF NATIVE CORONARY ARTERY WITHOUT ANGINA PECTORIS: Chronic | Status: ACTIVE | Noted: 2019-09-22

## 2023-03-30 NOTE — PROGRESS NOTE ADULT - SUBJECTIVE AND OBJECTIVE BOX
Addended by: Germán Gaines on: 3/30/2023 11:57 AM     Modules accepted: Orders TEAM Surgery Progress Note    INTERVAL EVENTS: Patient is s/p diagnostic lap and removal of foreign body. No acute events overnight.  SUBJECTIVE: Patient seen and examined at bedside with surgical team    OBJECTIVE:    Vital Signs Last 24 Hrs  T(C): 36.7 (29 May 2022 22:45), Max: 37.2 (29 May 2022 12:22)  T(F): 98 (29 May 2022 22:45), Max: 99 (29 May 2022 12:22)  HR: 75 (29 May 2022 22:45) (66 - 84)  BP: 123/58 (29 May 2022 22:45) (106/34 - 144/85)  BP(mean): 63 (29 May 2022 20:57) (52 - 66)  RR: 18 (29 May 2022 22:45) (9 - 19)  SpO2: 99% (29 May 2022 22:45) (91% - 100%)I&O's Detail    29 May 2022 07:01  -  30 May 2022 01:24  --------------------------------------------------------  IN:    IV PiggyBack: 100 mL    Oral Fluid: 240 mL    sodium chloride 0.9%: 125 mL  Total IN: 465 mL    OUT:    Voided (mL): 500 mL  Total OUT: 500 mL    Total NET: -35 mL      MEDICATIONS  (STANDING):  budesonide 160 MICROgram(s)/formoterol 4.5 MICROgram(s) Inhaler 2 Puff(s) Inhalation two times a day  dextrose 5%. 1000 milliLiter(s) (50 mL/Hr) IV Continuous <Continuous>  dextrose 5%. 1000 milliLiter(s) (50 mL/Hr) IV Continuous <Continuous>  dextrose 5%. 1000 milliLiter(s) (100 mL/Hr) IV Continuous <Continuous>  dextrose 5%. 1000 milliLiter(s) (100 mL/Hr) IV Continuous <Continuous>  dextrose 50% Injectable 25 Gram(s) IV Push once  dextrose 50% Injectable 12.5 Gram(s) IV Push once  dextrose 50% Injectable 25 Gram(s) IV Push once  dextrose 50% Injectable 25 Gram(s) IV Push once  dextrose 50% Injectable 12.5 Gram(s) IV Push once  dextrose 50% Injectable 25 Gram(s) IV Push once  enoxaparin Injectable 40 milliGRAM(s) SubCutaneous every 24 hours  glucagon  Injectable 1 milliGRAM(s) IntraMuscular once  glucagon  Injectable 1 milliGRAM(s) IntraMuscular once  insulin glargine Injectable (LANTUS) 15 Unit(s) SubCutaneous at bedtime  insulin lispro (ADMELOG) corrective regimen sliding scale   SubCutaneous three times a day before meals  insulin lispro (ADMELOG) corrective regimen sliding scale   SubCutaneous at bedtime  levothyroxine Injectable 84 MICROGram(s) IV Push at bedtime  pantoprazole  Injectable 40 milliGRAM(s) IV Push two times a day  piperacillin/tazobactam IVPB. 3.375 Gram(s) IV Intermittent once  piperacillin/tazobactam IVPB.. 3.375 Gram(s) IV Intermittent every 8 hours  sodium chloride 0.9%. 1000 milliLiter(s) (125 mL/Hr) IV Continuous <Continuous>    MEDICATIONS  (PRN):  dextrose Oral Gel 15 Gram(s) Oral once PRN Blood Glucose LESS THAN 70 milliGRAM(s)/deciliter  dextrose Oral Gel 15 Gram(s) Oral once PRN Blood Glucose LESS THAN 70 milliGRAM(s)/deciliter      Physical Exam:  General: NAD, resting comfortably in bed  Neuro: A/O x 3, no focal deficits  Pulmonary: Nonlabored breathing, no respiratory distress  Cardiovascular: NSR  Abdominal: soft, NTTP, ND  Incision: C/D/I   Extremities: WWP    LABS:                        11.9   19.00 )-----------( 253      ( 29 May 2022 09:15 )             38.4         137  |  100  |  12  ----------------------------<  134<H>  5.3   |  24  |  0.65    Ca    9.5      29 May 2022 09:15  Mg     1.80         TPro  7.4  /  Alb  4.0  /  TBili  0.5  /  DBili  x   /  AST  32  /  ALT  28  /  AlkPhos  110      PT/INR - ( 29 May 2022 09:15 )   PT: 12.7 sec;   INR: 1.09 ratio         PTT - ( 29 May 2022 09:15 )  PTT:29.7 sec  LIVER FUNCTIONS - ( 29 May 2022 09:15 )  Alb: 4.0 g/dL / Pro: 7.4 g/dL / ALK PHOS: 110 U/L / ALT: 28 U/L / AST: 32 U/L / GGT: x           Urinalysis Basic - ( 29 May 2022 09:30 )    Color: Light Yellow / Appearance: Clear / S.013 / pH: x  Gluc: x / Ketone: Negative  / Bili: Negative / Urobili: <2 mg/dL   Blood: x / Protein: Negative / Nitrite: Negative   Leuk Esterase: Moderate / RBC: 0 /HPF / WBC 8 /HPF   Sq Epi: x / Non Sq Epi: 3 /HPF / Bacteria: Many      ABO Interpretation: B (22 @ 17:38)  ABO Interpretation: B (22 @ 09:45)      IMAGING:     TEAM Surgery Progress Note    INTERVAL EVENTS: EGD with no finding of foreign body. Patient is s/p diagnostic lap and removal of foreign body. No acute events overnight.  SUBJECTIVE: Patient seen and examined at bedside with surgical team    OBJECTIVE:    Vital Signs Last 24 Hrs  T(C): 36.7 (29 May 2022 22:45), Max: 37.2 (29 May 2022 12:22)  T(F): 98 (29 May 2022 22:45), Max: 99 (29 May 2022 12:22)  HR: 75 (29 May 2022 22:45) (66 - 84)  BP: 123/58 (29 May 2022 22:45) (106/34 - 144/85)  BP(mean): 63 (29 May 2022 20:57) (52 - 66)  RR: 18 (29 May 2022 22:45) (9 - 19)  SpO2: 99% (29 May 2022 22:45) (91% - 100%)I&O's Detail    29 May 2022 07:01  -  30 May 2022 01:24  --------------------------------------------------------  IN:    IV PiggyBack: 100 mL    Oral Fluid: 240 mL    sodium chloride 0.9%: 125 mL  Total IN: 465 mL    OUT:    Voided (mL): 500 mL  Total OUT: 500 mL    Total NET: -35 mL      MEDICATIONS  (STANDING):  budesonide 160 MICROgram(s)/formoterol 4.5 MICROgram(s) Inhaler 2 Puff(s) Inhalation two times a day  dextrose 5%. 1000 milliLiter(s) (50 mL/Hr) IV Continuous <Continuous>  dextrose 5%. 1000 milliLiter(s) (50 mL/Hr) IV Continuous <Continuous>  dextrose 5%. 1000 milliLiter(s) (100 mL/Hr) IV Continuous <Continuous>  dextrose 5%. 1000 milliLiter(s) (100 mL/Hr) IV Continuous <Continuous>  dextrose 50% Injectable 25 Gram(s) IV Push once  dextrose 50% Injectable 12.5 Gram(s) IV Push once  dextrose 50% Injectable 25 Gram(s) IV Push once  dextrose 50% Injectable 25 Gram(s) IV Push once  dextrose 50% Injectable 12.5 Gram(s) IV Push once  dextrose 50% Injectable 25 Gram(s) IV Push once  enoxaparin Injectable 40 milliGRAM(s) SubCutaneous every 24 hours  glucagon  Injectable 1 milliGRAM(s) IntraMuscular once  glucagon  Injectable 1 milliGRAM(s) IntraMuscular once  insulin glargine Injectable (LANTUS) 15 Unit(s) SubCutaneous at bedtime  insulin lispro (ADMELOG) corrective regimen sliding scale   SubCutaneous three times a day before meals  insulin lispro (ADMELOG) corrective regimen sliding scale   SubCutaneous at bedtime  levothyroxine Injectable 84 MICROGram(s) IV Push at bedtime  pantoprazole  Injectable 40 milliGRAM(s) IV Push two times a day  piperacillin/tazobactam IVPB. 3.375 Gram(s) IV Intermittent once  piperacillin/tazobactam IVPB.. 3.375 Gram(s) IV Intermittent every 8 hours  sodium chloride 0.9%. 1000 milliLiter(s) (125 mL/Hr) IV Continuous <Continuous>    MEDICATIONS  (PRN):  dextrose Oral Gel 15 Gram(s) Oral once PRN Blood Glucose LESS THAN 70 milliGRAM(s)/deciliter  dextrose Oral Gel 15 Gram(s) Oral once PRN Blood Glucose LESS THAN 70 milliGRAM(s)/deciliter      Physical Exam:  General: NAD, resting comfortably in bed  Neuro: A/O x 3, no focal deficits  Pulmonary: Nonlabored breathing, no respiratory distress  Cardiovascular: NSR  Abdominal: soft, NTTP, ND  Incision: C/D/I   Extremities: WWP    LABS:                        11.9   19.00 )-----------( 253      ( 29 May 2022 09:15 )             38.4         137  |  100  |  12  ----------------------------<  134<H>  5.3   |  24  |  0.65    Ca    9.5      29 May 2022 09:15  Mg     1.80         TPro  7.4  /  Alb  4.0  /  TBili  0.5  /  DBili  x   /  AST  32  /  ALT  28  /  AlkPhos  110      PT/INR - ( 29 May 2022 09:15 )   PT: 12.7 sec;   INR: 1.09 ratio         PTT - ( 29 May 2022 09:15 )  PTT:29.7 sec  LIVER FUNCTIONS - ( 29 May 2022 09:15 )  Alb: 4.0 g/dL / Pro: 7.4 g/dL / ALK PHOS: 110 U/L / ALT: 28 U/L / AST: 32 U/L / GGT: x           Urinalysis Basic - ( 29 May 2022 09:30 )    Color: Light Yellow / Appearance: Clear / S.013 / pH: x  Gluc: x / Ketone: Negative  / Bili: Negative / Urobili: <2 mg/dL   Blood: x / Protein: Negative / Nitrite: Negative   Leuk Esterase: Moderate / RBC: 0 /HPF / WBC 8 /HPF   Sq Epi: x / Non Sq Epi: 3 /HPF / Bacteria: Many      ABO Interpretation: B (22 @ 17:38)  ABO Interpretation: B (22 @ 09:45)      IMAGING:     Acute Care Surgery Progress Note    INTERVAL EVENTS: EGD with no finding of foreign body. Patient is s/p diagnostic lap and removal of foreign body. No acute events overnight.  SUBJECTIVE: Patient seen and examined at bedside with surgical team. Pain controlled. No nausea/vomiting.    OBJECTIVE:    Vital Signs Last 24 Hrs  T(C): 36.7 (29 May 2022 22:45), Max: 37.2 (29 May 2022 12:22)  T(F): 98 (29 May 2022 22:45), Max: 99 (29 May 2022 12:22)  HR: 75 (29 May 2022 22:45) (66 - 84)  BP: 123/58 (29 May 2022 22:45) (106/34 - 144/85)  BP(mean): 63 (29 May 2022 20:57) (52 - 66)  RR: 18 (29 May 2022 22:45) (9 - 19)  SpO2: 99% (29 May 2022 22:45) (91% - 100%)I&O's Detail    29 May 2022 07:01  -  30 May 2022 01:24  --------------------------------------------------------  IN:    IV PiggyBack: 100 mL    Oral Fluid: 240 mL    sodium chloride 0.9%: 125 mL  Total IN: 465 mL    OUT:    Voided (mL): 500 mL  Total OUT: 500 mL    Total NET: -35 mL      MEDICATIONS  (STANDING):  budesonide 160 MICROgram(s)/formoterol 4.5 MICROgram(s) Inhaler 2 Puff(s) Inhalation two times a day  dextrose 5%. 1000 milliLiter(s) (50 mL/Hr) IV Continuous <Continuous>  dextrose 5%. 1000 milliLiter(s) (50 mL/Hr) IV Continuous <Continuous>  dextrose 5%. 1000 milliLiter(s) (100 mL/Hr) IV Continuous <Continuous>  dextrose 5%. 1000 milliLiter(s) (100 mL/Hr) IV Continuous <Continuous>  dextrose 50% Injectable 25 Gram(s) IV Push once  dextrose 50% Injectable 12.5 Gram(s) IV Push once  dextrose 50% Injectable 25 Gram(s) IV Push once  dextrose 50% Injectable 25 Gram(s) IV Push once  dextrose 50% Injectable 12.5 Gram(s) IV Push once  dextrose 50% Injectable 25 Gram(s) IV Push once  enoxaparin Injectable 40 milliGRAM(s) SubCutaneous every 24 hours  glucagon  Injectable 1 milliGRAM(s) IntraMuscular once  glucagon  Injectable 1 milliGRAM(s) IntraMuscular once  insulin glargine Injectable (LANTUS) 15 Unit(s) SubCutaneous at bedtime  insulin lispro (ADMELOG) corrective regimen sliding scale   SubCutaneous three times a day before meals  insulin lispro (ADMELOG) corrective regimen sliding scale   SubCutaneous at bedtime  levothyroxine Injectable 84 MICROGram(s) IV Push at bedtime  pantoprazole  Injectable 40 milliGRAM(s) IV Push two times a day  piperacillin/tazobactam IVPB. 3.375 Gram(s) IV Intermittent once  piperacillin/tazobactam IVPB.. 3.375 Gram(s) IV Intermittent every 8 hours  sodium chloride 0.9%. 1000 milliLiter(s) (125 mL/Hr) IV Continuous <Continuous>    MEDICATIONS  (PRN):  dextrose Oral Gel 15 Gram(s) Oral once PRN Blood Glucose LESS THAN 70 milliGRAM(s)/deciliter  dextrose Oral Gel 15 Gram(s) Oral once PRN Blood Glucose LESS THAN 70 milliGRAM(s)/deciliter      Physical Exam:  General: NAD, resting comfortably in bed  CV: Regular rate  Resp: Nonlabored breathing on room air  Abdominal: soft, NTTP, ND, port sites with operative dressings in place that are c/d/i  Extremities: [facility]      LABS:                        11.9   19.00 )-----------( 253      ( 29 May 2022 09:15 )             38.4         137  |  100  |  12  ----------------------------<  134<H>  5.3   |  24  |  0.65    Ca    9.5      29 May 2022 09:15  Mg     1.80         TPro  7.4  /  Alb  4.0  /  TBili  0.5  /  DBili  x   /  AST  32  /  ALT  28  /  AlkPhos  110      PT/INR - ( 29 May 2022 09:15 )   PT: 12.7 sec;   INR: 1.09 ratio         PTT - ( 29 May 2022 09:15 )  PTT:29.7 sec  LIVER FUNCTIONS - ( 29 May 2022 09:15 )  Alb: 4.0 g/dL / Pro: 7.4 g/dL / ALK PHOS: 110 U/L / ALT: 28 U/L / AST: 32 U/L / GGT: x           Urinalysis Basic - ( 29 May 2022 09:30 )    Color: Light Yellow / Appearance: Clear / S.013 / pH: x  Gluc: x / Ketone: Negative  / Bili: Negative / Urobili: <2 mg/dL   Blood: x / Protein: Negative / Nitrite: Negative   Leuk Esterase: Moderate / RBC: 0 /HPF / WBC 8 /HPF   Sq Epi: x / Non Sq Epi: 3 /HPF / Bacteria: Many      ABO Interpretation: B (22 @ 17:38)  ABO Interpretation: B (22 @ 09:45)      IMAGING:

## 2023-07-09 ENCOUNTER — EMERGENCY (EMERGENCY)
Facility: HOSPITAL | Age: 68
LOS: 1 days | Discharge: HOME | End: 2023-07-09
Attending: EMERGENCY MEDICINE | Admitting: EMERGENCY MEDICINE
Payer: MEDICARE

## 2023-07-09 VITALS
DIASTOLIC BLOOD PRESSURE: 79 MMHG | HEART RATE: 89 BPM | OXYGEN SATURATION: 96 % | TEMPERATURE: 98 F | RESPIRATION RATE: 18 BRPM | SYSTOLIC BLOOD PRESSURE: 168 MMHG

## 2023-07-09 PROCEDURE — 99285 EMERGENCY DEPT VISIT HI MDM: CPT

## 2023-07-09 PROCEDURE — 93010 ELECTROCARDIOGRAM REPORT: CPT

## 2023-07-09 NOTE — ED ADULT TRIAGE NOTE - CHIEF COMPLAINT QUOTE
pt aox4, ambulatory steady gait- comes in for complaints of cough for 1 month that is getting worse along with generalized chest and back discomfort, worse with movement. pt denies fevers. denies sick contacts. also endorsing intermittent shortness of breath. pt speaking full and clear sentences. medical hx: htn, dm, asthma, CAD w/ stent.

## 2023-07-10 VITALS
TEMPERATURE: 98 F | SYSTOLIC BLOOD PRESSURE: 142 MMHG | HEART RATE: 76 BPM | OXYGEN SATURATION: 100 % | RESPIRATION RATE: 18 BRPM | DIASTOLIC BLOOD PRESSURE: 68 MMHG

## 2023-07-10 LAB
ALBUMIN SERPL ELPH-MCNC: 3.8 G/DL — SIGNIFICANT CHANGE UP (ref 3.3–5)
ALP SERPL-CCNC: 106 U/L — SIGNIFICANT CHANGE UP (ref 40–120)
ALT FLD-CCNC: 32 U/L — SIGNIFICANT CHANGE UP (ref 4–33)
ANION GAP SERPL CALC-SCNC: 14 MMOL/L — SIGNIFICANT CHANGE UP (ref 7–14)
APPEARANCE UR: CLEAR — SIGNIFICANT CHANGE UP
APTT BLD: 51.8 SEC — HIGH (ref 27–36.3)
AST SERPL-CCNC: 25 U/L — SIGNIFICANT CHANGE UP (ref 4–32)
B PERT DNA SPEC QL NAA+PROBE: SIGNIFICANT CHANGE UP
B PERT+PARAPERT DNA PNL SPEC NAA+PROBE: SIGNIFICANT CHANGE UP
BASOPHILS # BLD AUTO: 0.04 K/UL — SIGNIFICANT CHANGE UP (ref 0–0.2)
BASOPHILS NFR BLD AUTO: 0.4 % — SIGNIFICANT CHANGE UP (ref 0–2)
BILIRUB SERPL-MCNC: 0.3 MG/DL — SIGNIFICANT CHANGE UP (ref 0.2–1.2)
BILIRUB UR-MCNC: NEGATIVE — SIGNIFICANT CHANGE UP
BLOOD GAS VENOUS COMPREHENSIVE RESULT: SIGNIFICANT CHANGE UP
BORDETELLA PARAPERTUSSIS (RAPRVP): SIGNIFICANT CHANGE UP
BUN SERPL-MCNC: 12 MG/DL — SIGNIFICANT CHANGE UP (ref 7–23)
C PNEUM DNA SPEC QL NAA+PROBE: SIGNIFICANT CHANGE UP
CALCIUM SERPL-MCNC: 9.7 MG/DL — SIGNIFICANT CHANGE UP (ref 8.4–10.5)
CHLORIDE SERPL-SCNC: 100 MMOL/L — SIGNIFICANT CHANGE UP (ref 98–107)
CO2 SERPL-SCNC: 22 MMOL/L — SIGNIFICANT CHANGE UP (ref 22–31)
COLOR SPEC: YELLOW — SIGNIFICANT CHANGE UP
CREAT SERPL-MCNC: 0.78 MG/DL — SIGNIFICANT CHANGE UP (ref 0.5–1.3)
D DIMER BLD IA.RAPID-MCNC: 422 NG/ML DDU — HIGH
DIFF PNL FLD: NEGATIVE — SIGNIFICANT CHANGE UP
EGFR: 82 ML/MIN/1.73M2 — SIGNIFICANT CHANGE UP
EOSINOPHIL # BLD AUTO: 0.92 K/UL — HIGH (ref 0–0.5)
EOSINOPHIL NFR BLD AUTO: 8.3 % — HIGH (ref 0–6)
FLUAV SUBTYP SPEC NAA+PROBE: SIGNIFICANT CHANGE UP
FLUBV RNA SPEC QL NAA+PROBE: SIGNIFICANT CHANGE UP
GLUCOSE SERPL-MCNC: 286 MG/DL — HIGH (ref 70–99)
GLUCOSE UR QL: 500 MG/DL
HADV DNA SPEC QL NAA+PROBE: SIGNIFICANT CHANGE UP
HCOV 229E RNA SPEC QL NAA+PROBE: SIGNIFICANT CHANGE UP
HCOV HKU1 RNA SPEC QL NAA+PROBE: SIGNIFICANT CHANGE UP
HCOV NL63 RNA SPEC QL NAA+PROBE: SIGNIFICANT CHANGE UP
HCOV OC43 RNA SPEC QL NAA+PROBE: SIGNIFICANT CHANGE UP
HCT VFR BLD CALC: 40 % — SIGNIFICANT CHANGE UP (ref 34.5–45)
HGB BLD-MCNC: 12.1 G/DL — SIGNIFICANT CHANGE UP (ref 11.5–15.5)
HMPV RNA SPEC QL NAA+PROBE: SIGNIFICANT CHANGE UP
HPIV1 RNA SPEC QL NAA+PROBE: SIGNIFICANT CHANGE UP
HPIV2 RNA SPEC QL NAA+PROBE: SIGNIFICANT CHANGE UP
HPIV3 RNA SPEC QL NAA+PROBE: SIGNIFICANT CHANGE UP
HPIV4 RNA SPEC QL NAA+PROBE: SIGNIFICANT CHANGE UP
IANC: 6.42 K/UL — SIGNIFICANT CHANGE UP (ref 1.8–7.4)
IMM GRANULOCYTES NFR BLD AUTO: 0.4 % — SIGNIFICANT CHANGE UP (ref 0–0.9)
INR BLD: 1.37 RATIO — HIGH (ref 0.88–1.16)
KETONES UR-MCNC: NEGATIVE MG/DL — SIGNIFICANT CHANGE UP
LEUKOCYTE ESTERASE UR-ACNC: NEGATIVE — SIGNIFICANT CHANGE UP
LYMPHOCYTES # BLD AUTO: 2.9 K/UL — SIGNIFICANT CHANGE UP (ref 1–3.3)
LYMPHOCYTES # BLD AUTO: 26 % — SIGNIFICANT CHANGE UP (ref 13–44)
M PNEUMO DNA SPEC QL NAA+PROBE: SIGNIFICANT CHANGE UP
MAGNESIUM SERPL-MCNC: 1.9 MG/DL — SIGNIFICANT CHANGE UP (ref 1.6–2.6)
MCHC RBC-ENTMCNC: 24.4 PG — LOW (ref 27–34)
MCHC RBC-ENTMCNC: 30.3 GM/DL — LOW (ref 32–36)
MCV RBC AUTO: 80.8 FL — SIGNIFICANT CHANGE UP (ref 80–100)
MONOCYTES # BLD AUTO: 0.82 K/UL — SIGNIFICANT CHANGE UP (ref 0–0.9)
MONOCYTES NFR BLD AUTO: 7.4 % — SIGNIFICANT CHANGE UP (ref 2–14)
NEUTROPHILS # BLD AUTO: 6.42 K/UL — SIGNIFICANT CHANGE UP (ref 1.8–7.4)
NEUTROPHILS NFR BLD AUTO: 57.5 % — SIGNIFICANT CHANGE UP (ref 43–77)
NITRITE UR-MCNC: NEGATIVE — SIGNIFICANT CHANGE UP
NRBC # BLD: 0 /100 WBCS — SIGNIFICANT CHANGE UP (ref 0–0)
NRBC # FLD: 0 K/UL — SIGNIFICANT CHANGE UP (ref 0–0)
NT-PROBNP SERPL-SCNC: 46 PG/ML — SIGNIFICANT CHANGE UP
PH UR: 6.5 — SIGNIFICANT CHANGE UP (ref 5–8)
PLATELET # BLD AUTO: 193 K/UL — SIGNIFICANT CHANGE UP (ref 150–400)
POTASSIUM SERPL-MCNC: 4.6 MMOL/L — SIGNIFICANT CHANGE UP (ref 3.5–5.3)
POTASSIUM SERPL-SCNC: 4.6 MMOL/L — SIGNIFICANT CHANGE UP (ref 3.5–5.3)
PROT SERPL-MCNC: 6.7 G/DL — SIGNIFICANT CHANGE UP (ref 6–8.3)
PROT UR-MCNC: NEGATIVE MG/DL — SIGNIFICANT CHANGE UP
PROTHROM AB SERPL-ACNC: 16 SEC — HIGH (ref 10.5–13.4)
RAPID RVP RESULT: SIGNIFICANT CHANGE UP
RBC # BLD: 4.95 M/UL — SIGNIFICANT CHANGE UP (ref 3.8–5.2)
RBC # FLD: 16.7 % — HIGH (ref 10.3–14.5)
RSV RNA SPEC QL NAA+PROBE: SIGNIFICANT CHANGE UP
RV+EV RNA SPEC QL NAA+PROBE: SIGNIFICANT CHANGE UP
SARS-COV-2 RNA SPEC QL NAA+PROBE: SIGNIFICANT CHANGE UP
SODIUM SERPL-SCNC: 136 MMOL/L — SIGNIFICANT CHANGE UP (ref 135–145)
SP GR SPEC: 1.02 — SIGNIFICANT CHANGE UP (ref 1–1.03)
TROPONIN T, HIGH SENSITIVITY RESULT: 7 NG/L — SIGNIFICANT CHANGE UP
UROBILINOGEN FLD QL: 0.2 MG/DL — SIGNIFICANT CHANGE UP (ref 0.2–1)
WBC # BLD: 11.14 K/UL — HIGH (ref 3.8–10.5)
WBC # FLD AUTO: 11.14 K/UL — HIGH (ref 3.8–10.5)

## 2023-07-10 PROCEDURE — 71045 X-RAY EXAM CHEST 1 VIEW: CPT | Mod: 26

## 2023-07-10 PROCEDURE — 74177 CT ABD & PELVIS W/CONTRAST: CPT | Mod: 26,MA

## 2023-07-10 PROCEDURE — 71275 CT ANGIOGRAPHY CHEST: CPT | Mod: 26,MA

## 2023-07-10 RX ORDER — DEXTROMETHORPHAN POLISTIREX 30 MG/5 ML
5 SUSPENSION, EXTENDED RELEASE 12 HR ORAL
Qty: 60 | Refills: 0
Start: 2023-07-10 | End: 2023-07-12

## 2023-07-10 RX ORDER — CODEINE PHOSPHATE/GUAIFENESIN
7.5 SYRUP ORAL
Qty: 22.5 | Refills: 0
Start: 2023-07-10 | End: 2023-07-12

## 2023-07-10 RX ORDER — IPRATROPIUM/ALBUTEROL SULFATE 18-103MCG
3 AEROSOL WITH ADAPTER (GRAM) INHALATION EVERY 6 HOURS
Refills: 0 | Status: DISCONTINUED | OUTPATIENT
Start: 2023-07-10 | End: 2023-07-13

## 2023-07-10 RX ORDER — FAMOTIDINE 10 MG/ML
20 INJECTION INTRAVENOUS ONCE
Refills: 0 | Status: COMPLETED | OUTPATIENT
Start: 2023-07-10 | End: 2023-07-10

## 2023-07-10 RX ORDER — SUCRALFATE 1 G
10 TABLET ORAL
Qty: 900 | Refills: 0
Start: 2023-07-10 | End: 2023-08-08

## 2023-07-10 RX ORDER — LIDOCAINE 4 G/100G
10 CREAM TOPICAL ONCE
Refills: 0 | Status: COMPLETED | OUTPATIENT
Start: 2023-07-10 | End: 2023-07-10

## 2023-07-10 RX ORDER — SODIUM CHLORIDE 9 MG/ML
1000 INJECTION INTRAMUSCULAR; INTRAVENOUS; SUBCUTANEOUS ONCE
Refills: 0 | Status: COMPLETED | OUTPATIENT
Start: 2023-07-10 | End: 2023-07-10

## 2023-07-10 RX ADMIN — SODIUM CHLORIDE 1000 MILLILITER(S): 9 INJECTION INTRAMUSCULAR; INTRAVENOUS; SUBCUTANEOUS at 01:32

## 2023-07-10 RX ADMIN — LIDOCAINE 10 MILLILITER(S): 4 CREAM TOPICAL at 01:32

## 2023-07-10 RX ADMIN — Medication 30 MILLILITER(S): at 01:32

## 2023-07-10 RX ADMIN — FAMOTIDINE 20 MILLIGRAM(S): 10 INJECTION INTRAVENOUS at 01:32

## 2023-07-10 NOTE — ED PROVIDER NOTE - NSFOLLOWUPINSTRUCTIONS_ED_ALL_ED_FT
You were seen in the Emergency Department for cough and abdominal pain.     1) Advance activity as tolerated.   2) Continue all previously prescribed medications as directed.    3) Follow up with your primary care physician in 24-48 hours - take copies of your results.    4) Return to the Emergency Department for worsening or persistent symptoms, and/or ANY NEW OR CONCERNING SYMPTOMS.      your workup showed no evidence of pneumonia, clot in the lungs, or infection in the abdomen.     the cough at night may be due to worsening acid reflux.     take the prescribed sucralfate three times a day, including before bed.     sleep on an incline if it helps.     avoid spicy and acidic food, especially before bed time.     a teaspoon of honey can also help coat the throat and help with cough.     see a gastroenterologist outpatient to have an endoscopy.     if you have sudden severe abdominal pain, fever, shortness of breath, or any other new or concerning symptoms seek immediate medical attention or return to the ER.       see your primary care doctor this week for follow up. bring this packet with you so they can see the results and follow up as appropriate.

## 2023-07-10 NOTE — ED PROVIDER NOTE - CLINICAL SUMMARY MEDICAL DECISION MAKING FREE TEXT BOX
Chris PGY3: 67yo F with PMH of HTN, DM2, CAD, asthma on inhalers, hypothyroidism presents to ED for eval of 6wks of dry non-productive cough and inc lower abd pain. No focal of pain on abd exam, otherwise well appearing. Differential Diagnosis includes but not limited to viral cough vs gastritis vs PUD vs diverticulosis vs other malignancy vs less likely ACS given reported neg stress. EKG, labs including trop and D-dimer, urine, CT a/p.

## 2023-07-10 NOTE — ED PROVIDER NOTE - PATIENT PORTAL LINK FT
You can access the FollowMyHealth Patient Portal offered by [facility] by registering at the following website: [email or web address]. By joining [facility]’s FollowMyHealth portal, you will also be able to view your health information using other applications (apps) compatible with our system.

## 2023-07-10 NOTE — ED PROVIDER NOTE - ATTENDING CONTRIBUTION TO CARE
I performed a face-to-face evaluation of the patient and performed a history and physical examination along with the resident or ACP, and/or medical student above.  I agree with the history and physical examination as documented by the resident or ACP, and/or medical student above.  [place]arden[place]:  CONSTITUTIONAL: in no acute distress   SKIN: Warm dry  HEAD: NCAT  EYES: NL inspection  ENT: dry oral mucosa   NECK: Supple  CARD: RRR  RESP: lungs CTA w/o wheezing or crackles, no coughing during exam   ABD: soft, non-distended, some TTP b/l lower quad but no rebound/guarding   EXT: no edema  NEURO: Grossly non-focal   PSYCH: Cooperative, appropriate.

## 2023-07-10 NOTE — ED ADULT NURSE NOTE - TEMPLATE
The pt was alert and interactive. She was oriented x3+ and able to verbalize during communicative probes as well as in conversation. At these times, her motor speech abilities were functional, her speech output was intelligible and her utterances were linguistically intact/contextually appropriate. The pt was able to effectively verbalize her needs and is at reported communicative baseline. Note that the pt denied Dysphagia.
General

## 2023-07-10 NOTE — ED ADULT NURSE NOTE - NSFALLUNIVINTERV_ED_ALL_ED
Bed/Stretcher in lowest position, wheels locked, appropriate side rails in place/Call bell, personal items and telephone in reach/Instruct patient to call for assistance before getting out of bed/chair/stretcher/Non-slip footwear applied when patient is off stretcher/[place] to call system/Physically safe environment - no spills, clutter or unnecessary equipment/Purposeful proactive rounding/Room/bathroom lighting operational, light cord in reach

## 2023-07-10 NOTE — ED ADULT NURSE NOTE - OBJECTIVE STATEMENT
68 yof presents A&Ox4, c/o worsening cough over the past month associated w/ chest discomfort. States she went to her doctor twice and has been taking antibiotics without relief. Respirations even and unlabored, sating 100% on room air, normal sinus on monitor, speaking in full sentences without any difficulty. Pt denies any dyspnea, dizziness, nausea, vomiting, diarrhea, or fevers. PHx CAD stent x1. MD at bedside for eval. Pending further orders. bed in lowest position, side rails up, call bell in hand, safety maintained.

## 2023-07-10 NOTE — ED PROVIDER NOTE - NSICDXPASTMEDICALHX_GEN_ALL_CORE_FT
PAST MEDICAL HISTORY:  Asthma     CAD (coronary artery disease) s/p stent 2010    DM (diabetes mellitus)     HTN (hypertension)     Hypothyroid

## 2023-07-10 NOTE — ED PROVIDER NOTE - OBJECTIVE STATEMENT
67yo F with PMH of HTN, DM2, CAD, asthma on inhalers, hypothyroidism presents to ED for eval of 6wks of cough and inc abd pain. For last 6wk had a dry hacking cough that was not assoc with other URI sxs or fever or productive. Then developed LUQ/L lower chest pain that happened intermittently, worse w/ coughing, short lived but intense. Pain now also radiating to lower quadrants, also similar short lived but 10/10 and stabbing. Pain does also seem to radiate to L shoulder. No hematuria, dysuria, NV, blood in stool or urine. No substernal CP or SOB. Has gone to her PCP x3 times and pulm providers in this time. Went to cards 3mo ago and at that time had stress that was per pt normal.

## 2023-07-10 NOTE — ED PROVIDER NOTE - PHYSICAL EXAMINATION
CONSTITUTIONAL: elderly F in no acute distress   SKIN: Warm dry  HEAD: NCAT  EYES: NL inspection  ENT: dry oral mucosa   NECK: Supple  CARD: RRR  RESP: lungs CTA w/o wheezing or crackles, no coughing during exam   ABD: soft, non-distended, some TTP b/l lower quad but no rebound/guarding   EXT: no edema  NEURO: Grossly non-focal   PSYCH: Cooperative, appropriate.

## 2023-07-11 LAB
CULTURE RESULTS: SIGNIFICANT CHANGE UP
SPECIMEN SOURCE: SIGNIFICANT CHANGE UP

## 2024-05-27 NOTE — ED ADULT TRIAGE NOTE - NSTRIAGECARE_GEN_A_ER
"Reports good relief of anxiety with hydroxyzine during May 2024 hospitalization.  We discussed "vicious cycle" of anxiety and dyspnea in the setting of advance COPD/emphysema.  " EKG

## (undated) DEVICE — LINE BREATHE SAMPLNG

## (undated) DEVICE — VENODYNE/SCD SLEEVE CALF MEDIUM

## (undated) DEVICE — PACK GENERAL LAPAROSCOPY

## (undated) DEVICE — DISSECTOR ENDOSCOPIC KITTNER SINGLE TIP

## (undated) DEVICE — GOWN LG

## (undated) DEVICE — SALIVA EJECTOR (BLUE)

## (undated) DEVICE — SUT VICRYL 0 27" UR-6

## (undated) DEVICE — TIP METZENBAUM SCISSOR MONOPOLAR ENDOCUT (ORANGE)

## (undated) DEVICE — SCOPE WARMER SEAL DISP

## (undated) DEVICE — TROCAR COVIDIEN BLUNT TIP HASSAN 10MM

## (undated) DEVICE — BITE BLOCK ADULT 20 X 27MM (GREEN)

## (undated) DEVICE — ENDOCATCH 10MM SPECIMEN POUCH

## (undated) DEVICE — TROCAR COVIDIEN VERSAPORT BLADELESS OPTICAL 5MM STANDARD

## (undated) DEVICE — SHEARS COVIDIEN ENDO SHEAR 5MM X 31CM W UNIPOLAR CAUTERY

## (undated) DEVICE — SOL IRR POUR NS 0.9% 500ML

## (undated) DEVICE — PROTECTOR HEEL / ELBOW FLUFFY

## (undated) DEVICE — TUBING HYDRO-SURG PLUS IRRIGATOR W SMOKEVAC & PROBE

## (undated) DEVICE — STAPLER COVIDIEN ENDO GIA STANDARD HANDLE

## (undated) DEVICE — SUT MONOCRYL 4-0 27" PS-2 UNDYED

## (undated) DEVICE — SOL IRR POUR H2O 500ML

## (undated) DEVICE — POSITIONER STRAP ARMBOARD VELCRO TS-30

## (undated) DEVICE — LIGASURE BLUNT TIP 37CM

## (undated) DEVICE — ELCTR GROUNDING PAD ADULT COVIDIEN

## (undated) DEVICE — TROCAR COVIDIEN BLUNT TIP HASSAN 10MM STANDARD

## (undated) DEVICE — LUBRICATING JELLY HR ONE SHOT 3G

## (undated) DEVICE — DRAPE TOWEL BLUE 17" X 24"

## (undated) DEVICE — TUBING OLYMPUS INSUFFLATION

## (undated) DEVICE — CONTAINER FORMALIN 10% 20ML

## (undated) DEVICE — WARMING BLANKET FULL ADULT

## (undated) DEVICE — TUBING MEDI-VAC W MAXIGRIP CONNECTORS 1/4"X6'

## (undated) DEVICE — DRSG CURITY GAUZE SPONGE 4 X 4" 12-PLY NON-STERILE

## (undated) DEVICE — TUBING IV SET GRAVITY 3Y 100" MACRO

## (undated) DEVICE — LABELS BLANK W PEN

## (undated) DEVICE — BASIN SET SINGLE

## (undated) DEVICE — UNDERPAD LINEN SAVER 17 X 24"

## (undated) DEVICE — CANISTER DISPOSABLE THIN WALL 3000CC